# Patient Record
Sex: FEMALE | Race: WHITE | NOT HISPANIC OR LATINO | Employment: FULL TIME | URBAN - METROPOLITAN AREA
[De-identification: names, ages, dates, MRNs, and addresses within clinical notes are randomized per-mention and may not be internally consistent; named-entity substitution may affect disease eponyms.]

---

## 2021-05-31 ENCOUNTER — HOSPITAL ENCOUNTER (EMERGENCY)
Age: 66
Discharge: HOME | End: 2021-05-31
Attending: EMERGENCY MEDICINE

## 2021-05-31 VITALS
TEMPERATURE: 98 F | SYSTOLIC BLOOD PRESSURE: 141 MMHG | RESPIRATION RATE: 24 BRPM | OXYGEN SATURATION: 94 % | DIASTOLIC BLOOD PRESSURE: 71 MMHG | HEART RATE: 84 BPM

## 2021-05-31 DIAGNOSIS — L50.9 HIVES: ICD-10-CM

## 2021-05-31 DIAGNOSIS — T78.40XA ALLERGIC REACTION, INITIAL ENCOUNTER: Primary | ICD-10-CM

## 2021-05-31 PROCEDURE — 96375 TX/PRO/DX INJ NEW DRUG ADDON: CPT

## 2021-05-31 PROCEDURE — 99282 EMERGENCY DEPT VISIT SF MDM: CPT

## 2021-05-31 PROCEDURE — 10002807 HB RX 258: Performed by: EMERGENCY MEDICINE

## 2021-05-31 PROCEDURE — 10002800 HB RX 250 W HCPCS: Performed by: EMERGENCY MEDICINE

## 2021-05-31 PROCEDURE — 10002801 HB RX 250 W/O HCPCS: Performed by: EMERGENCY MEDICINE

## 2021-05-31 PROCEDURE — 96374 THER/PROPH/DIAG INJ IV PUSH: CPT

## 2021-05-31 RX ORDER — ATORVASTATIN CALCIUM 10 MG/1
TABLET, FILM COATED ORAL
COMMUNITY
Start: 2021-05-24

## 2021-05-31 RX ORDER — EPINEPHRINE 0.3 MG/.3ML
0.3 INJECTION SUBCUTANEOUS
Qty: 1 EACH | Refills: 1 | Status: SHIPPED | OUTPATIENT
Start: 2021-05-31

## 2021-05-31 RX ORDER — METFORMIN HYDROCHLORIDE 500 MG/1
TABLET, EXTENDED RELEASE ORAL
COMMUNITY
Start: 2021-05-24

## 2021-05-31 RX ORDER — FAMOTIDINE 10 MG/ML
20 INJECTION, SOLUTION INTRAVENOUS ONCE
Status: DISCONTINUED | OUTPATIENT
Start: 2021-05-31 | End: 2021-05-31 | Stop reason: HOSPADM

## 2021-05-31 RX ORDER — FAMOTIDINE 20 MG/1
20 TABLET, FILM COATED ORAL 2 TIMES DAILY
Qty: 10 TABLET | Refills: 0 | Status: SHIPPED | OUTPATIENT
Start: 2021-05-31 | End: 2021-06-05

## 2021-05-31 RX ORDER — EPINEPHRINE 0.3 MG/.3ML
INJECTION SUBCUTANEOUS
COMMUNITY
Start: 2019-02-19

## 2021-05-31 RX ORDER — DEXAMETHASONE SODIUM PHOSPHATE 10 MG/ML
10 INJECTION, SOLUTION INTRAMUSCULAR; INTRAVENOUS ONCE
Status: DISCONTINUED | OUTPATIENT
Start: 2021-05-31 | End: 2021-05-31 | Stop reason: HOSPADM

## 2021-05-31 RX ORDER — HYDROXYCHLOROQUINE SULFATE 200 MG/1
200 TABLET, FILM COATED ORAL DAILY
COMMUNITY
Start: 2021-05-03

## 2021-05-31 RX ORDER — PREDNISONE 20 MG/1
40 TABLET ORAL DAILY
Qty: 10 TABLET | Refills: 0 | Status: SHIPPED | OUTPATIENT
Start: 2021-05-31

## 2021-05-31 RX ORDER — MONTELUKAST SODIUM 10 MG/1
TABLET ORAL
COMMUNITY
Start: 2021-05-23

## 2021-05-31 RX ORDER — DIPHENHYDRAMINE HYDROCHLORIDE 50 MG/ML
25 INJECTION INTRAMUSCULAR; INTRAVENOUS ONCE
Status: DISCONTINUED | OUTPATIENT
Start: 2021-05-31 | End: 2021-05-31 | Stop reason: HOSPADM

## 2021-05-31 RX ADMIN — DEXAMETHASONE SODIUM PHOSPHATE 10 MG: 10 INJECTION, SOLUTION INTRAMUSCULAR; INTRAVENOUS at 06:50

## 2021-05-31 RX ADMIN — DIPHENHYDRAMINE HYDROCHLORIDE 25 MG: 50 INJECTION, SOLUTION INTRAMUSCULAR; INTRAVENOUS at 06:50

## 2021-05-31 RX ADMIN — SODIUM CHLORIDE 1000 ML: 9 INJECTION, SOLUTION INTRAVENOUS at 06:50

## 2021-05-31 RX ADMIN — FAMOTIDINE 20 MG: 10 INJECTION INTRAVENOUS at 06:50

## 2021-05-31 ASSESSMENT — PAIN SCALES - GENERAL
PAINLEVEL_OUTOF10: 0
PAINLEVEL_OUTOF10: 0

## 2024-05-28 ENCOUNTER — TELEPHONE (OUTPATIENT)
Dept: FAMILY MEDICINE CLINIC | Facility: CLINIC | Age: 69
End: 2024-05-28

## 2024-05-28 NOTE — TELEPHONE ENCOUNTER
ZARA on 06/18/24 with Dr. Arita @ [facility]    H&P- completed 05/31/24  Labs- BUN/Creat ratio (30.6), Calc Osmo (302), Albumin (5.1), A/G ratio (2.1), MRSA neg, all other labs WNL  EKG- WNL  X-ray- WNL

## 2024-05-29 RX ORDER — MONTELUKAST SODIUM 10 MG/1
10 TABLET ORAL DAILY
COMMUNITY

## 2024-05-29 RX ORDER — SPIRONOLACTONE 25 MG/1
25 TABLET ORAL DAILY
COMMUNITY

## 2024-05-29 RX ORDER — METFORMIN HYDROCHLORIDE EXTENDED-RELEASE TABLETS 1000 MG/1
2000 TABLET, FILM COATED, EXTENDED RELEASE ORAL
COMMUNITY

## 2024-05-29 RX ORDER — HYDROXYCHLOROQUINE SULFATE 200 MG/1
300 TABLET, FILM COATED ORAL NIGHTLY
COMMUNITY

## 2024-05-29 RX ORDER — LOSARTAN POTASSIUM AND HYDROCHLOROTHIAZIDE 12.5; 5 MG/1; MG/1
1 TABLET ORAL DAILY
COMMUNITY

## 2024-05-29 RX ORDER — MULTIVIT-MIN/IRON FUM/FOLIC AC 7.5 MG-4
1 TABLET ORAL DAILY
COMMUNITY

## 2024-05-29 RX ORDER — CHOLECALCIFEROL (VITAMIN D3) 50 MCG
TABLET ORAL
COMMUNITY

## 2024-05-29 RX ORDER — PHENOL 1.4 %
1200 AEROSOL, SPRAY (ML) MUCOUS MEMBRANE DAILY
COMMUNITY

## 2024-05-29 RX ORDER — ATORVASTATIN CALCIUM 10 MG/1
10 TABLET, FILM COATED ORAL NIGHTLY
COMMUNITY

## 2024-05-31 ENCOUNTER — LAB ENCOUNTER (OUTPATIENT)
Dept: LAB | Facility: HOSPITAL | Age: 69
End: 2024-05-31
Attending: FAMILY MEDICINE
Payer: MEDICARE

## 2024-05-31 ENCOUNTER — HOSPITAL ENCOUNTER (OUTPATIENT)
Dept: GENERAL RADIOLOGY | Facility: HOSPITAL | Age: 69
Discharge: HOME | End: 2024-05-31
Attending: FAMILY MEDICINE
Payer: MEDICARE

## 2024-05-31 ENCOUNTER — OFFICE VISIT (OUTPATIENT)
Dept: FAMILY MEDICINE CLINIC | Facility: CLINIC | Age: 69
End: 2024-05-31

## 2024-05-31 VITALS
HEART RATE: 83 BPM | HEIGHT: 63 IN | BODY MASS INDEX: 29.7 KG/M2 | OXYGEN SATURATION: 98 % | DIASTOLIC BLOOD PRESSURE: 68 MMHG | WEIGHT: 167.63 LBS | TEMPERATURE: 98 F | SYSTOLIC BLOOD PRESSURE: 118 MMHG | RESPIRATION RATE: 16 BRPM

## 2024-05-31 DIAGNOSIS — Z01.812 PRE-OPERATIVE LABORATORY EXAMINATION: ICD-10-CM

## 2024-05-31 DIAGNOSIS — M15.9 PRIMARY OSTEOARTHRITIS INVOLVING MULTIPLE JOINTS: ICD-10-CM

## 2024-05-31 DIAGNOSIS — Z01.818 PREOPERATIVE EXAMINATION, UNSPECIFIED: ICD-10-CM

## 2024-05-31 DIAGNOSIS — M17.12 PRIMARY OSTEOARTHRITIS OF LEFT KNEE: Primary | ICD-10-CM

## 2024-05-31 DIAGNOSIS — T88.59XS COMPLICATION OF ANESTHESIA, SEQUELA: ICD-10-CM

## 2024-05-31 DIAGNOSIS — R73.01 ELEVATED FASTING BLOOD SUGAR: ICD-10-CM

## 2024-05-31 DIAGNOSIS — T78.2XXS ANAPHYLAXIS, SEQUELA: ICD-10-CM

## 2024-05-31 DIAGNOSIS — I10 PRIMARY HYPERTENSION: ICD-10-CM

## 2024-05-31 DIAGNOSIS — E04.9 GOITER, NON-TOXIC: ICD-10-CM

## 2024-05-31 DIAGNOSIS — I34.1 MVP (MITRAL VALVE PROLAPSE): ICD-10-CM

## 2024-05-31 DIAGNOSIS — E78.2 MIXED HYPERLIPIDEMIA: ICD-10-CM

## 2024-05-31 DIAGNOSIS — R25.2 BILATERAL LEG CRAMPS: ICD-10-CM

## 2024-05-31 DIAGNOSIS — J45.20 MILD INTERMITTENT ASTHMA WITHOUT COMPLICATION (HCC): ICD-10-CM

## 2024-05-31 LAB
ALBUMIN SERPL-MCNC: 5.1 G/DL (ref 3.2–4.8)
ALBUMIN/GLOB SERPL: 2.1 {RATIO} (ref 1–2)
ALP LIVER SERPL-CCNC: 59 U/L
ALT SERPL-CCNC: 14 U/L
ANION GAP SERPL CALC-SCNC: 9 MMOL/L (ref 0–18)
ANTIBODY SCREEN: NEGATIVE
AST SERPL-CCNC: 23 U/L (ref ?–34)
ATRIAL RATE: 72 BPM
BASOPHILS # BLD AUTO: 0.03 X10(3) UL (ref 0–0.2)
BASOPHILS NFR BLD AUTO: 0.4 %
BILIRUB SERPL-MCNC: 0.8 MG/DL (ref 0.2–1.1)
BUN BLD-MCNC: 19 MG/DL (ref 9–23)
BUN/CREAT SERPL: 30.6 (ref 10–20)
CALCIUM BLD-MCNC: 9.8 MG/DL (ref 8.7–10.4)
CHLORIDE SERPL-SCNC: 107 MMOL/L (ref 98–112)
CO2 SERPL-SCNC: 29 MMOL/L (ref 21–32)
CREAT BLD-MCNC: 0.62 MG/DL
DEPRECATED RDW RBC AUTO: 43.7 FL (ref 35.1–46.3)
EGFRCR SERPLBLD CKD-EPI 2021: 98 ML/MIN/1.73M2 (ref 60–?)
EOSINOPHIL # BLD AUTO: 0.08 X10(3) UL (ref 0–0.7)
EOSINOPHIL NFR BLD AUTO: 1.1 %
ERYTHROCYTE [DISTWIDTH] IN BLOOD BY AUTOMATED COUNT: 13.1 % (ref 11–15)
EST. AVERAGE GLUCOSE BLD GHB EST-MCNC: 111 MG/DL (ref 68–126)
FASTING STATUS PATIENT QL REPORTED: YES
GLOBULIN PLAS-MCNC: 2.4 G/DL (ref 2–3.5)
GLUCOSE BLD-MCNC: 90 MG/DL (ref 70–99)
HBA1C MFR BLD: 5.5 % (ref ?–5.7)
HCT VFR BLD AUTO: 38.5 %
HGB BLD-MCNC: 13.5 G/DL
IMM GRANULOCYTES # BLD AUTO: 0.01 X10(3) UL (ref 0–1)
IMM GRANULOCYTES NFR BLD: 0.1 %
LYMPHOCYTES # BLD AUTO: 2.59 X10(3) UL (ref 1–4)
LYMPHOCYTES NFR BLD AUTO: 34.1 %
MCH RBC QN AUTO: 32.1 PG (ref 26–34)
MCHC RBC AUTO-ENTMCNC: 35.1 G/DL (ref 31–37)
MCV RBC AUTO: 91.4 FL
MONOCYTES # BLD AUTO: 0.52 X10(3) UL (ref 0.1–1)
MONOCYTES NFR BLD AUTO: 6.9 %
NEUTROPHILS # BLD AUTO: 4.36 X10 (3) UL (ref 1.5–7.7)
NEUTROPHILS # BLD AUTO: 4.36 X10(3) UL (ref 1.5–7.7)
NEUTROPHILS NFR BLD AUTO: 57.4 %
OSMOLALITY SERPL CALC.SUM OF ELEC: 302 MOSM/KG (ref 275–295)
P AXIS: 24 DEGREES
P-R INTERVAL: 180 MS
PLATELET # BLD AUTO: 247 10(3)UL (ref 150–450)
POTASSIUM SERPL-SCNC: 3.9 MMOL/L (ref 3.5–5.1)
PROT SERPL-MCNC: 7.5 G/DL (ref 5.7–8.2)
Q-T INTERVAL: 422 MS
QRS DURATION: 94 MS
QTC CALCULATION (BEZET): 462 MS
R AXIS: 0 DEGREES
RBC # BLD AUTO: 4.21 X10(6)UL
RH BLOOD TYPE: POSITIVE
SODIUM SERPL-SCNC: 145 MMOL/L (ref 136–145)
T AXIS: 60 DEGREES
VENTRICULAR RATE: 72 BPM
WBC # BLD AUTO: 7.6 X10(3) UL (ref 4–11)

## 2024-05-31 PROCEDURE — 80053 COMPREHEN METABOLIC PANEL: CPT

## 2024-05-31 PROCEDURE — 99204 OFFICE O/P NEW MOD 45 MIN: CPT | Performed by: FAMILY MEDICINE

## 2024-05-31 PROCEDURE — 86901 BLOOD TYPING SEROLOGIC RH(D): CPT | Performed by: FAMILY MEDICINE

## 2024-05-31 PROCEDURE — 93005 ELECTROCARDIOGRAM TRACING: CPT

## 2024-05-31 PROCEDURE — 83036 HEMOGLOBIN GLYCOSYLATED A1C: CPT

## 2024-05-31 PROCEDURE — 71046 X-RAY EXAM CHEST 2 VIEWS: CPT | Performed by: FAMILY MEDICINE

## 2024-05-31 PROCEDURE — 93010 ELECTROCARDIOGRAM REPORT: CPT | Performed by: STUDENT IN AN ORGANIZED HEALTH CARE EDUCATION/TRAINING PROGRAM

## 2024-05-31 PROCEDURE — 87081 CULTURE SCREEN ONLY: CPT

## 2024-05-31 PROCEDURE — 85025 COMPLETE CBC W/AUTO DIFF WBC: CPT

## 2024-05-31 PROCEDURE — 86850 RBC ANTIBODY SCREEN: CPT | Performed by: FAMILY MEDICINE

## 2024-05-31 PROCEDURE — 99499 UNLISTED E&M SERVICE: CPT | Performed by: FAMILY MEDICINE

## 2024-05-31 PROCEDURE — 86900 BLOOD TYPING SEROLOGIC ABO: CPT | Performed by: FAMILY MEDICINE

## 2024-05-31 PROCEDURE — 36415 COLL VENOUS BLD VENIPUNCTURE: CPT

## 2024-05-31 NOTE — H&P
[facility] PRE-OP CLINIC [place]    PRE-OP NOTE    HPI:   I have been consulted by Dr. Arita to see Grace Silva 68 year old female for a preoperative evaluation and medical clearance. She  has a long history of worsening severe degenerative arthritis of her left knee . Patient is to have a left total knee arthroplasty  by Dr. Arita  on 6/11/2024.     Pt suffers significant pain and loss of function.     She has no cardiopulmonary symptoms.      She has no history of KELSY or DVT. Denies tobacco use.       Family History   Problem Relation Age of Onset    Heart Disorder Father     Diabetes Mother     Other (Other) Maternal Grandmother         anerysm      Current Outpatient Medications   Medication Sig Dispense Refill    Multiple Vitamins-Minerals (MULTI-VITAMIN/MINERALS) Oral Tab Take 1 tablet by mouth daily.      spironolactone 25 MG Oral Tab Take 1 tablet (25 mg total) by mouth daily.      losartan-hydroCHLOROthiazide 50-12.5 MG Oral Tab Take 1 tablet by mouth daily.      montelukast 10 MG Oral Tab Take 1 tablet (10 mg total) by mouth nightly.      Calcium Carbonate 600 MG Oral Tab Take 2 tablets (1,200 mg total) by mouth daily.      metFORMIN HCl ER, OSM, 1000 MG (OSM) Oral Tablet 24 Hr Take 2 tablets (2,000 mg total) by mouth daily with breakfast.      hydroxychloroquine 200 MG Oral Tab Take 1.5 tablets (300 mg total) by mouth nightly.      atorvastatin 10 MG Oral Tab Take 1 tablet (10 mg total) by mouth nightly.      Cholecalciferol (VITAMIN D) 50 MCG (2000 UT) Oral Tab Take by mouth.       Past Medical History:    Arthritis    Asthma (HCC)    Heart disease    Heart valve disease    MVP    High blood pressure    History of adverse reaction to anesthesia    hard to wake up    Hyperlipidemia    Hypertension    MVP (mitral valve prolapse)    Nontoxic uninodular goiter    Osteoarthritis    Prediabetes    Visual impairment    glasses     Past Surgical History:   Procedure Laterality Date    Breast biopsy  Left     x2 both benign          Other surgical history      ruptured cyst on ovary, ectopic pregnancy. Fallopian tube removed    Skin biopsy       Social History     Socioeconomic History    Marital status: Not on file     Spouse name: Not on file    Number of children: Not on file    Years of education: Not on file    Highest education level: Not on file   Occupational History    Not on file   Tobacco Use    Smoking status: Never     Passive exposure: Past    Smokeless tobacco: Never   Vaping Use    Vaping status: Never Used   Substance and Sexual Activity    Alcohol use: Not Currently    Drug use: Never    Sexual activity: Not on file   Other Topics Concern    Not on file   Social History Narrative    Not on file     Social Determinants of Health     Financial Resource Strain: Not on file   Food Insecurity: Not on file   Transportation Needs: Not on file   Physical Activity: Not on file   Stress: Not on file   Social Connections: Not on file   Housing Stability: Not on file       REVIEW OF SYSTEMS:   CONSTITUTIONAL:  Denies unusual weight gain/loss, fever, chills  EENT:  Eyes:  Denies eye pain, visual loss, blurred vision, double vision. Ears, Nose, Throat:  Denies congestion, runny nose or sore throat.  INTEGUMENTARY:  Denies rashes, itching, skin lesion,   CARDIOVASCULAR:  Denies DVT. Denies chest pain, palpitations, edema, dyspnea  RESPIRATORY: She denies  KELSY ,Denies shortness of breath, wheezing, cough  GASTROINTESTINAL:  Denies abdominal pain, nausea, vomiting, constipation, diarrhea, or blood in stool.  MUSCULOSKELETAL: severe pain to her joints as noted above  NEUROLOGICAL:  Denies headache, seizures, dizziness, syncope, paralysis, ataxia,  HEMATOLOGIC:  Denies anemia, bleeding or bruising.  LYMPHATICS:  Denies enlarged nodes   PSYCHIATRIC:  Denies depression or anxiety.  ENDOCRINOLOGIC: DM 2 YES,   ALLERGIES:  Denies allergic response, history of asthma, hives,     EXAM:   /68 (BP  Location: Left arm)   Pulse 83   Temp 97.9 °F (36.6 °C) (Temporal)   Resp 16   Ht 5' 3\" (1.6 m)   Wt 167 lb 9.6 oz (76 kg)   SpO2 98%   BMI 29.69 kg/m²  Estimated body mass index is 29.69 kg/m² as calculated from the following:    Height as of this encounter: 5' 3\" (1.6 m).    Weight as of this encounter: 167 lb 9.6 oz (76 kg).   Vital signs reviewed.Appears stated age, well groomed.  Physical Exam:  GEN:  Patient is alert, awake and oriented, well developed, well nourished, no apparent distress.  HEENT:  Head:  Normocephalic, atraumatic  Nose: patent, no nasal discharge  NECK: Supple, no CLAD, no carotid bruit, no thyromegaly.  SKIN: No rashes, no skin lesion, no bruising, good turgor.  HEART:  Regular rate and rhythm, no murmurs, rubs or gallops.  LUNGS: Clear to auscultation bilterally, no rales/rhonchi/wheezing.  CHEST: No tenderness.  ABDOMEN:  Soft, nondistended, nontender,  no masses, no hepatosplenomegaly.  BACK: No tenderness, no spasm,   EXTREMITIES:  hypertrophic changes to knees ,   NEURO:  No focal deficit, speech fluent, she has an antalgic  gait, strength and tone, sensory intact      No results found for: \"WBC\", \"HGB\", \"HCT\", \"PLT\", \"CREATSERUM\", \"BUN\", \"NA\", \"K\", \"CL\", \"CO2\", \"GLU\", \"CA\", \"ALB\", \"ALKPHO\", \"BILT\", \"TP\", \"AST\", \"ALT\", \"PTT\", \"INR\", \"PT\", \"T4F\", \"TSH\", \"TSHREFLEX\", \"JASPAL\", \"LIP\", \"GGT\", \"PSA\", \"DDIMER\", \"ESRML\", \"ESRPF\", \"CRP\", \"BNP\", \"MG\", \"PHOS\", \"TROP\", \"CK\", \"CKMB\", \"ASHU\", \"RPR\", \"B12\", \"ETOH\", \"POCGLU\"    No results found.          ASSESSMENT AND PLAN:   Grace Silva is a 68 year old female, with a hx of severe degenerative arthritis of her knees  who presents for a pre-operative physical exam. Patient is to have a left total knee arthroplasty  by Dr. Arita  on 6/11/2024.      ICD-10-CM    1. Primary osteoarthritis of left knee  M17.12       2. Primary osteoarthritis involving multiple joints  M15.9       3. Mild intermittent asthma without complication (HCC)  J45.20        4. MVP (mitral valve prolapse)  I34.1       5. Primary hypertension  I10       6. Anaphylaxis to Aspirin and NSAIDS  T78.2XXS       7. Mixed hyperlipidemia  E78.2       8. history of thyroid goiter, non-toxic  E04.9       9. Bilateral leg cramps  R25.2       10. History of difficulty waking after  anesthesia and nausea sequela  T88.59XS       11. BMI 30.0-30.9,adult  Z68.30       12. Elevated fasting blood sugar  R73.01 CBC With Differential With Platelet     Comp Metabolic Panel (14)     EKG 12 Lead     Hemoglobin A1C     MSSA and MRSA Culture Screen     XR CHEST PA + LAT CHEST ([id number])     Type and screen      13. Preoperative examination, unspecified  Z01.818 CBC With Differential With Platelet     Comp Metabolic Panel (14)     EKG 12 Lead     Hemoglobin A1C     MSSA and MRSA Culture Screen     XR CHEST PA + LAT CHEST ([id number])     Type and screen      14. Pre-operative laboratory examination  Z01.812 CBC With Differential With Platelet     Comp Metabolic Panel (14)     EKG 12 Lead     Hemoglobin A1C     MSSA and MRSA Culture Screen     XR CHEST PA + LAT CHEST ([id number])     Type and screen         ECG and labs are pending review     Preoperative Risk Stratification: There are no decompensated medical conditions. ASA classification 2    Medical history:  High risk surgery (vascular, thoracic, intra-peritoneal): No  CAD: No  CHF: No  Stroke: No  DM on insulin: No  Serum Creatinine >2 mg/dl: No  Patient has an RCRI score that is  low risk and is at low risk for major cardiac event in the perioperative period.     Patient is medically optimized and has an acceptable risk of surgery and may proceed with surgery as planned.     PLAN:    Patient to discontinue medications and supplements with anticoagulation properties as per instruction.        Postoperative Recommendations:    Anticoagulation / DVT prophylaxis: SCDs, early ambulation and either Warfarin, Eliquis , or Xarelto after surgery due to ASA  allergy      GI protection: Protonix  Incentive Spirometry   Telemetry as needed  CPAP/O2 as needed   DM: QID glucoscans and sliding scale insulin as needed   Renal protection: (hydration / NSAID and ACE/ARB avoidance)   Cognitive protection: (minimize narcotics, benzodiazepines, scopolamine)     Pain management and Physical therapy as per Orthopedic service.   Home Health as needed    Thank you for the opportunity to care for your patient and to assist in managing the postoperative course.        Nick Garcias DO  5/31/2024  10:36 AM

## 2024-05-31 NOTE — H&P (VIEW-ONLY)
[facility] PRE-OP CLINIC [place]    PRE-OP NOTE    HPI:   I have been consulted by Dr. Arita to see Grace Silva 68 year old female for a preoperative evaluation and medical clearance. She  has a long history of worsening severe degenerative arthritis of her left knee . Patient is to have a left total knee arthroplasty  by Dr. Arita  on 6/11/2024.     Pt suffers significant pain and loss of function.     She has no cardiopulmonary symptoms.      She has no history of KELSY or DVT. Denies tobacco use.       Family History   Problem Relation Age of Onset    Heart Disorder Father     Diabetes Mother     Other (Other) Maternal Grandmother         anerysm      Current Outpatient Medications   Medication Sig Dispense Refill    Multiple Vitamins-Minerals (MULTI-VITAMIN/MINERALS) Oral Tab Take 1 tablet by mouth daily.      spironolactone 25 MG Oral Tab Take 1 tablet (25 mg total) by mouth daily.      losartan-hydroCHLOROthiazide 50-12.5 MG Oral Tab Take 1 tablet by mouth daily.      montelukast 10 MG Oral Tab Take 1 tablet (10 mg total) by mouth nightly.      Calcium Carbonate 600 MG Oral Tab Take 2 tablets (1,200 mg total) by mouth daily.      metFORMIN HCl ER, OSM, 1000 MG (OSM) Oral Tablet 24 Hr Take 2 tablets (2,000 mg total) by mouth daily with breakfast.      hydroxychloroquine 200 MG Oral Tab Take 1.5 tablets (300 mg total) by mouth nightly.      atorvastatin 10 MG Oral Tab Take 1 tablet (10 mg total) by mouth nightly.      Cholecalciferol (VITAMIN D) 50 MCG (2000 UT) Oral Tab Take by mouth.       Past Medical History:    Arthritis    Asthma (HCC)    Heart disease    Heart valve disease    MVP    High blood pressure    History of adverse reaction to anesthesia    hard to wake up    Hyperlipidemia    Hypertension    MVP (mitral valve prolapse)    Nontoxic uninodular goiter    Osteoarthritis    Prediabetes    Visual impairment    glasses     Past Surgical History:   Procedure Laterality Date    Breast biopsy  Left     x2 both benign          Other surgical history      ruptured cyst on ovary, ectopic pregnancy. Fallopian tube removed    Skin biopsy       Social History     Socioeconomic History    Marital status: Not on file     Spouse name: Not on file    Number of children: Not on file    Years of education: Not on file    Highest education level: Not on file   Occupational History    Not on file   Tobacco Use    Smoking status: Never     Passive exposure: Past    Smokeless tobacco: Never   Vaping Use    Vaping status: Never Used   Substance and Sexual Activity    Alcohol use: Not Currently    Drug use: Never    Sexual activity: Not on file   Other Topics Concern    Not on file   Social History Narrative    Not on file     Social Determinants of Health     Financial Resource Strain: Not on file   Food Insecurity: Not on file   Transportation Needs: Not on file   Physical Activity: Not on file   Stress: Not on file   Social Connections: Not on file   Housing Stability: Not on file       REVIEW OF SYSTEMS:   CONSTITUTIONAL:  Denies unusual weight gain/loss, fever, chills  EENT:  Eyes:  Denies eye pain, visual loss, blurred vision, double vision. Ears, Nose, Throat:  Denies congestion, runny nose or sore throat.  INTEGUMENTARY:  Denies rashes, itching, skin lesion,   CARDIOVASCULAR:  Denies DVT. Denies chest pain, palpitations, edema, dyspnea  RESPIRATORY: She denies  KELSY ,Denies shortness of breath, wheezing, cough  GASTROINTESTINAL:  Denies abdominal pain, nausea, vomiting, constipation, diarrhea, or blood in stool.  MUSCULOSKELETAL: severe pain to her joints as noted above  NEUROLOGICAL:  Denies headache, seizures, dizziness, syncope, paralysis, ataxia,  HEMATOLOGIC:  Denies anemia, bleeding or bruising.  LYMPHATICS:  Denies enlarged nodes   PSYCHIATRIC:  Denies depression or anxiety.  ENDOCRINOLOGIC: DM 2 YES,   ALLERGIES:  Denies allergic response, history of asthma, hives,     EXAM:   /68 (BP  Location: Left arm)   Pulse 83   Temp 97.9 °F (36.6 °C) (Temporal)   Resp 16   Ht 5' 3\" (1.6 m)   Wt 167 lb 9.6 oz (76 kg)   SpO2 98%   BMI 29.69 kg/m²  Estimated body mass index is 29.69 kg/m² as calculated from the following:    Height as of this encounter: 5' 3\" (1.6 m).    Weight as of this encounter: 167 lb 9.6 oz (76 kg).   Vital signs reviewed.Appears stated age, well groomed.  Physical Exam:  GEN:  Patient is alert, awake and oriented, well developed, well nourished, no apparent distress.  HEENT:  Head:  Normocephalic, atraumatic  Nose: patent, no nasal discharge  NECK: Supple, no CLAD, no carotid bruit, no thyromegaly.  SKIN: No rashes, no skin lesion, no bruising, good turgor.  HEART:  Regular rate and rhythm, no murmurs, rubs or gallops.  LUNGS: Clear to auscultation bilterally, no rales/rhonchi/wheezing.  CHEST: No tenderness.  ABDOMEN:  Soft, nondistended, nontender,  no masses, no hepatosplenomegaly.  BACK: No tenderness, no spasm,   EXTREMITIES:  hypertrophic changes to knees ,   NEURO:  No focal deficit, speech fluent, she has an antalgic  gait, strength and tone, sensory intact      No results found for: \"WBC\", \"HGB\", \"HCT\", \"PLT\", \"CREATSERUM\", \"BUN\", \"NA\", \"K\", \"CL\", \"CO2\", \"GLU\", \"CA\", \"ALB\", \"ALKPHO\", \"BILT\", \"TP\", \"AST\", \"ALT\", \"PTT\", \"INR\", \"PT\", \"T4F\", \"TSH\", \"TSHREFLEX\", \"JASPAL\", \"LIP\", \"GGT\", \"PSA\", \"DDIMER\", \"ESRML\", \"ESRPF\", \"CRP\", \"BNP\", \"MG\", \"PHOS\", \"TROP\", \"CK\", \"CKMB\", \"ASHU\", \"RPR\", \"B12\", \"ETOH\", \"POCGLU\"    No results found.          ASSESSMENT AND PLAN:   Grace Silva is a 68 year old female, with a hx of severe degenerative arthritis of her knees  who presents for a pre-operative physical exam. Patient is to have a left total knee arthroplasty  by Dr. Arita  on 6/11/2024.      ICD-10-CM    1. Primary osteoarthritis of left knee  M17.12       2. Primary osteoarthritis involving multiple joints  M15.9       3. Mild intermittent asthma without complication (HCC)  J45.20        4. MVP (mitral valve prolapse)  I34.1       5. Primary hypertension  I10       6. Anaphylaxis to Aspirin and NSAIDS  T78.2XXS       7. Mixed hyperlipidemia  E78.2       8. history of thyroid goiter, non-toxic  E04.9       9. Bilateral leg cramps  R25.2       10. History of difficulty waking after  anesthesia and nausea sequela  T88.59XS       11. BMI 30.0-30.9,adult  Z68.30       12. Elevated fasting blood sugar  R73.01 CBC With Differential With Platelet     Comp Metabolic Panel (14)     EKG 12 Lead     Hemoglobin A1C     MSSA and MRSA Culture Screen     XR CHEST PA + LAT CHEST ([id number])     Type and screen      13. Preoperative examination, unspecified  Z01.818 CBC With Differential With Platelet     Comp Metabolic Panel (14)     EKG 12 Lead     Hemoglobin A1C     MSSA and MRSA Culture Screen     XR CHEST PA + LAT CHEST ([id number])     Type and screen      14. Pre-operative laboratory examination  Z01.812 CBC With Differential With Platelet     Comp Metabolic Panel (14)     EKG 12 Lead     Hemoglobin A1C     MSSA and MRSA Culture Screen     XR CHEST PA + LAT CHEST ([id number])     Type and screen         ECG and labs are pending review     Preoperative Risk Stratification: There are no decompensated medical conditions. ASA classification 2    Medical history:  High risk surgery (vascular, thoracic, intra-peritoneal): No  CAD: No  CHF: No  Stroke: No  DM on insulin: No  Serum Creatinine >2 mg/dl: No  Patient has an RCRI score that is  low risk and is at low risk for major cardiac event in the perioperative period.     Patient is medically optimized and has an acceptable risk of surgery and may proceed with surgery as planned.     PLAN:    Patient to discontinue medications and supplements with anticoagulation properties as per instruction.        Postoperative Recommendations:    Anticoagulation / DVT prophylaxis: SCDs, early ambulation and either Warfarin, Eliquis , or Xarelto after surgery due to ASA  allergy      GI protection: Protonix  Incentive Spirometry   Telemetry as needed  CPAP/O2 as needed   DM: QID glucoscans and sliding scale insulin as needed   Renal protection: (hydration / NSAID and ACE/ARB avoidance)   Cognitive protection: (minimize narcotics, benzodiazepines, scopolamine)     Pain management and Physical therapy as per Orthopedic service.   Home Health as needed    Thank you for the opportunity to care for your patient and to assist in managing the postoperative course.        Nick Garcias DO  5/31/2024  10:36 AM

## 2024-06-01 NOTE — TELEPHONE ENCOUNTER
Dr. Garcias, please review:    Labs- BUN/Creat ratio (30.6), Calc Osmo (302), Albumin (5.1), A/G ratio (2.1), MRSA neg, all other labs WNL    EKG- WNL    X-ray- WNL    OK for surgery?

## 2024-06-03 NOTE — TELEPHONE ENCOUNTER
Spoke to patient, went over all test results and let her know she is clear for surgery per Dr. Garcias.

## 2024-06-07 PROBLEM — M17.12 PRIMARY OSTEOARTHRITIS OF ONE KNEE, LEFT: Status: ACTIVE | Noted: 2024-06-07

## 2024-06-07 PROBLEM — R25.2 LEG CRAMPS: Status: ACTIVE | Noted: 2024-06-07

## 2024-06-07 PROBLEM — T78.2XXS: Status: ACTIVE | Noted: 2024-06-07

## 2024-06-14 NOTE — CM/SW NOTE
DSC received email from Annika CUMMINGS, Pt has been referred to [facility]  prior to surgery.    DSC to send referral via [facility] upon admission.     Юлия Miguel, DSC

## 2024-06-17 ENCOUNTER — ANESTHESIA EVENT (OUTPATIENT)
Dept: SURGERY | Facility: HOSPITAL | Age: 69
End: 2024-06-17

## 2024-06-18 ENCOUNTER — ANESTHESIA (OUTPATIENT)
Dept: SURGERY | Facility: HOSPITAL | Age: 69
End: 2024-06-18

## 2024-06-18 ENCOUNTER — APPOINTMENT (OUTPATIENT)
Dept: GENERAL RADIOLOGY | Facility: HOSPITAL | Age: 69
End: 2024-06-18
Attending: ORTHOPAEDIC SURGERY

## 2024-06-18 ENCOUNTER — HOSPITAL ENCOUNTER (OUTPATIENT)
Facility: HOSPITAL | Age: 69
LOS: 1 days | Discharge: HOME HEALTH | End: 2024-06-19
Attending: ORTHOPAEDIC SURGERY | Admitting: ORTHOPAEDIC SURGERY

## 2024-06-18 DIAGNOSIS — M17.12 PRIMARY OSTEOARTHRITIS OF ONE KNEE, LEFT: Primary | ICD-10-CM

## 2024-06-18 PROBLEM — R73.03 PREDIABETES: Status: ACTIVE | Noted: 2024-06-18

## 2024-06-18 LAB
GLUCOSE BLDC GLUCOMTR-MCNC: 124 MG/DL (ref 70–99)
GLUCOSE BLDC GLUCOMTR-MCNC: 133 MG/DL (ref 70–99)
GLUCOSE BLDC GLUCOMTR-MCNC: 192 MG/DL (ref 70–99)
RH BLOOD TYPE: POSITIVE

## 2024-06-18 PROCEDURE — 97161 PT EVAL LOW COMPLEX 20 MIN: CPT

## 2024-06-18 PROCEDURE — 82962 GLUCOSE BLOOD TEST: CPT

## 2024-06-18 PROCEDURE — 97530 THERAPEUTIC ACTIVITIES: CPT

## 2024-06-18 PROCEDURE — 88305 TISSUE EXAM BY PATHOLOGIST: CPT | Performed by: ORTHOPAEDIC SURGERY

## 2024-06-18 PROCEDURE — 88311 DECALCIFY TISSUE: CPT | Performed by: ORTHOPAEDIC SURGERY

## 2024-06-18 PROCEDURE — 0SRD0J9 REPLACEMENT OF LEFT KNEE JOINT WITH SYNTHETIC SUBSTITUTE, CEMENTED, OPEN APPROACH: ICD-10-PCS | Performed by: ORTHOPAEDIC SURGERY

## 2024-06-18 PROCEDURE — 73560 X-RAY EXAM OF KNEE 1 OR 2: CPT | Performed by: ORTHOPAEDIC SURGERY

## 2024-06-18 DEVICE — IMPLANTABLE DEVICE
Type: IMPLANTABLE DEVICE | Site: KNEE | Status: FUNCTIONAL
Brand: PERSONA®

## 2024-06-18 DEVICE — IMPLANTABLE DEVICE
Type: IMPLANTABLE DEVICE | Site: KNEE | Status: FUNCTIONAL
Brand: PERSONA® VIVACIT-E®

## 2024-06-18 DEVICE — IMPLANTABLE DEVICE
Type: IMPLANTABLE DEVICE | Site: KNEE | Status: FUNCTIONAL
Brand: PERSONA® NATURAL TIBIA®

## 2024-06-18 DEVICE — FULL DOSE BONE CEMENT, 10 PACK CATALOG NUMBER IS 6191-1-010
Type: IMPLANTABLE DEVICE | Site: KNEE | Status: FUNCTIONAL
Brand: SIMPLEX

## 2024-06-18 RX ORDER — DEXTROSE MONOHYDRATE 25 G/50ML
50 INJECTION, SOLUTION INTRAVENOUS
Status: DISCONTINUED | OUTPATIENT
Start: 2024-06-18 | End: 2024-06-19

## 2024-06-18 RX ORDER — HYDROXYCHLOROQUINE SULFATE 200 MG/1
300 TABLET, FILM COATED ORAL NIGHTLY
Status: DISCONTINUED | OUTPATIENT
Start: 2024-06-18 | End: 2024-06-19

## 2024-06-18 RX ORDER — DOCUSATE SODIUM 100 MG/1
100 CAPSULE, LIQUID FILLED ORAL 2 TIMES DAILY
Status: DISCONTINUED | OUTPATIENT
Start: 2024-06-18 | End: 2024-06-19

## 2024-06-18 RX ORDER — NALOXONE HYDROCHLORIDE 4 MG/.1ML
4 SPRAY NASAL AS NEEDED
Qty: 1 KIT | Refills: 0 | Status: SHIPPED | OUTPATIENT
Start: 2024-06-18

## 2024-06-18 RX ORDER — LIDOCAINE HYDROCHLORIDE 10 MG/ML
INJECTION, SOLUTION EPIDURAL; INFILTRATION; INTRACAUDAL; PERINEURAL AS NEEDED
Status: DISCONTINUED | OUTPATIENT
Start: 2024-06-18 | End: 2024-06-18 | Stop reason: SURG

## 2024-06-18 RX ORDER — NICOTINE POLACRILEX 4 MG
30 LOZENGE BUCCAL
Status: DISCONTINUED | OUTPATIENT
Start: 2024-06-18 | End: 2024-06-19

## 2024-06-18 RX ORDER — NICOTINE POLACRILEX 4 MG
15 LOZENGE BUCCAL
Status: DISCONTINUED | OUTPATIENT
Start: 2024-06-18 | End: 2024-06-19

## 2024-06-18 RX ORDER — SODIUM CHLORIDE, SODIUM LACTATE, POTASSIUM CHLORIDE, CALCIUM CHLORIDE 600; 310; 30; 20 MG/100ML; MG/100ML; MG/100ML; MG/100ML
INJECTION, SOLUTION INTRAVENOUS CONTINUOUS
Status: DISCONTINUED | OUTPATIENT
Start: 2024-06-18 | End: 2024-06-19

## 2024-06-18 RX ORDER — ONDANSETRON 2 MG/ML
4 INJECTION INTRAMUSCULAR; INTRAVENOUS EVERY 6 HOURS PRN
Status: DISCONTINUED | OUTPATIENT
Start: 2024-06-18 | End: 2024-06-18 | Stop reason: HOSPADM

## 2024-06-18 RX ORDER — DIPHENHYDRAMINE HYDROCHLORIDE 50 MG/ML
12.5 INJECTION INTRAMUSCULAR; INTRAVENOUS EVERY 4 HOURS PRN
Status: DISCONTINUED | OUTPATIENT
Start: 2024-06-18 | End: 2024-06-19

## 2024-06-18 RX ORDER — SODIUM CHLORIDE, SODIUM LACTATE, POTASSIUM CHLORIDE, CALCIUM CHLORIDE 600; 310; 30; 20 MG/100ML; MG/100ML; MG/100ML; MG/100ML
INJECTION, SOLUTION INTRAVENOUS CONTINUOUS
Status: DISCONTINUED | OUTPATIENT
Start: 2024-06-18 | End: 2024-06-18

## 2024-06-18 RX ORDER — SODIUM CHLORIDE 9 MG/ML
INJECTION, SOLUTION INTRAVENOUS CONTINUOUS
Status: DISCONTINUED | OUTPATIENT
Start: 2024-06-18 | End: 2024-06-19

## 2024-06-18 RX ORDER — METOCLOPRAMIDE HYDROCHLORIDE 5 MG/ML
10 INJECTION INTRAMUSCULAR; INTRAVENOUS EVERY 8 HOURS PRN
Status: DISCONTINUED | OUTPATIENT
Start: 2024-06-18 | End: 2024-06-19

## 2024-06-18 RX ORDER — NALOXONE HYDROCHLORIDE 0.4 MG/ML
80 INJECTION, SOLUTION INTRAMUSCULAR; INTRAVENOUS; SUBCUTANEOUS AS NEEDED
Status: DISCONTINUED | OUTPATIENT
Start: 2024-06-18 | End: 2024-06-18 | Stop reason: HOSPADM

## 2024-06-18 RX ORDER — METOCLOPRAMIDE HYDROCHLORIDE 5 MG/ML
10 INJECTION INTRAMUSCULAR; INTRAVENOUS EVERY 8 HOURS PRN
Status: DISCONTINUED | OUTPATIENT
Start: 2024-06-18 | End: 2024-06-18 | Stop reason: HOSPADM

## 2024-06-18 RX ORDER — MIDAZOLAM HYDROCHLORIDE 1 MG/ML
INJECTION INTRAMUSCULAR; INTRAVENOUS AS NEEDED
Status: DISCONTINUED | OUTPATIENT
Start: 2024-06-18 | End: 2024-06-18 | Stop reason: SURG

## 2024-06-18 RX ORDER — HYDROMORPHONE HYDROCHLORIDE 1 MG/ML
0.4 INJECTION, SOLUTION INTRAMUSCULAR; INTRAVENOUS; SUBCUTANEOUS EVERY 5 MIN PRN
Status: DISCONTINUED | OUTPATIENT
Start: 2024-06-18 | End: 2024-06-18 | Stop reason: HOSPADM

## 2024-06-18 RX ORDER — TRANEXAMIC ACID 10 MG/ML
1000 INJECTION, SOLUTION INTRAVENOUS ONCE
Status: DISCONTINUED | OUTPATIENT
Start: 2024-06-19 | End: 2024-06-18 | Stop reason: HOSPADM

## 2024-06-18 RX ORDER — HYDROMORPHONE HYDROCHLORIDE 1 MG/ML
0.2 INJECTION, SOLUTION INTRAMUSCULAR; INTRAVENOUS; SUBCUTANEOUS EVERY 2 HOUR PRN
Status: DISCONTINUED | OUTPATIENT
Start: 2024-06-18 | End: 2024-06-19

## 2024-06-18 RX ORDER — MAGNESIUM HYDROXIDE 1200 MG/15ML
LIQUID ORAL CONTINUOUS PRN
Status: DISCONTINUED | OUTPATIENT
Start: 2024-06-18 | End: 2024-06-19

## 2024-06-18 RX ORDER — ONDANSETRON 2 MG/ML
4 INJECTION INTRAMUSCULAR; INTRAVENOUS EVERY 6 HOURS PRN
Status: DISCONTINUED | OUTPATIENT
Start: 2024-06-18 | End: 2024-06-19

## 2024-06-18 RX ORDER — MONTELUKAST SODIUM 10 MG/1
10 TABLET ORAL NIGHTLY
Status: DISCONTINUED | OUTPATIENT
Start: 2024-06-18 | End: 2024-06-19

## 2024-06-18 RX ORDER — POLYETHYLENE GLYCOL 3350 17 G/17G
17 POWDER, FOR SOLUTION ORAL DAILY PRN
Status: DISCONTINUED | OUTPATIENT
Start: 2024-06-18 | End: 2024-06-19

## 2024-06-18 RX ORDER — SENNOSIDES 8.6 MG
17.2 TABLET ORAL NIGHTLY
Status: DISCONTINUED | OUTPATIENT
Start: 2024-06-18 | End: 2024-06-19

## 2024-06-18 RX ORDER — ENEMA 19; 7 G/133ML; G/133ML
1 ENEMA RECTAL ONCE AS NEEDED
Status: DISCONTINUED | OUTPATIENT
Start: 2024-06-18 | End: 2024-06-19

## 2024-06-18 RX ORDER — TRANEXAMIC ACID 10 MG/ML
1000 INJECTION, SOLUTION INTRAVENOUS ONCE
Status: COMPLETED | OUTPATIENT
Start: 2024-06-18 | End: 2024-06-19

## 2024-06-18 RX ORDER — MORPHINE SULFATE 10 MG/ML
6 INJECTION, SOLUTION INTRAMUSCULAR; INTRAVENOUS EVERY 10 MIN PRN
Status: DISCONTINUED | OUTPATIENT
Start: 2024-06-18 | End: 2024-06-18 | Stop reason: HOSPADM

## 2024-06-18 RX ORDER — TRAMADOL HYDROCHLORIDE 50 MG/1
50 TABLET ORAL EVERY 6 HOURS SCHEDULED
Status: DISCONTINUED | OUTPATIENT
Start: 2024-06-18 | End: 2024-06-19

## 2024-06-18 RX ORDER — ATORVASTATIN CALCIUM 10 MG/1
10 TABLET, FILM COATED ORAL NIGHTLY
Status: DISCONTINUED | OUTPATIENT
Start: 2024-06-18 | End: 2024-06-19

## 2024-06-18 RX ORDER — HYDROMORPHONE HYDROCHLORIDE 1 MG/ML
0.4 INJECTION, SOLUTION INTRAMUSCULAR; INTRAVENOUS; SUBCUTANEOUS EVERY 2 HOUR PRN
Status: DISCONTINUED | OUTPATIENT
Start: 2024-06-18 | End: 2024-06-19

## 2024-06-18 RX ORDER — OXYCODONE HYDROCHLORIDE 5 MG/1
5 TABLET ORAL EVERY 4 HOURS PRN
Status: SHIPPED | COMMUNITY
Start: 2024-06-18 | End: 2024-06-19

## 2024-06-18 RX ORDER — ACETAMINOPHEN 500 MG
1000 TABLET ORAL EVERY 8 HOURS SCHEDULED
Status: DISCONTINUED | OUTPATIENT
Start: 2024-06-18 | End: 2024-06-19

## 2024-06-18 RX ORDER — OXYCODONE HYDROCHLORIDE 5 MG/1
2.5 TABLET ORAL EVERY 4 HOURS PRN
Status: DISCONTINUED | OUTPATIENT
Start: 2024-06-18 | End: 2024-06-19

## 2024-06-18 RX ORDER — DIPHENHYDRAMINE HCL 25 MG
25 CAPSULE ORAL EVERY 4 HOURS PRN
Status: DISCONTINUED | OUTPATIENT
Start: 2024-06-18 | End: 2024-06-19

## 2024-06-18 RX ORDER — ESMOLOL HYDROCHLORIDE 10 MG/ML
INJECTION INTRAVENOUS AS NEEDED
Status: DISCONTINUED | OUTPATIENT
Start: 2024-06-18 | End: 2024-06-18 | Stop reason: SURG

## 2024-06-18 RX ORDER — HYDROMORPHONE HYDROCHLORIDE 1 MG/ML
0.6 INJECTION, SOLUTION INTRAMUSCULAR; INTRAVENOUS; SUBCUTANEOUS EVERY 5 MIN PRN
Status: DISCONTINUED | OUTPATIENT
Start: 2024-06-18 | End: 2024-06-18 | Stop reason: HOSPADM

## 2024-06-18 RX ORDER — BUPIVACAINE HYDROCHLORIDE 7.5 MG/ML
INJECTION, SOLUTION INTRASPINAL
Status: COMPLETED | OUTPATIENT
Start: 2024-06-18 | End: 2024-06-18

## 2024-06-18 RX ORDER — BISACODYL 10 MG
10 SUPPOSITORY, RECTAL RECTAL
Status: DISCONTINUED | OUTPATIENT
Start: 2024-06-18 | End: 2024-06-19

## 2024-06-18 RX ORDER — TRANEXAMIC ACID 100 MG/ML
INJECTION, SOLUTION INTRAVENOUS AS NEEDED
Status: DISCONTINUED | OUTPATIENT
Start: 2024-06-18 | End: 2024-06-18 | Stop reason: SURG

## 2024-06-18 RX ORDER — MORPHINE SULFATE 4 MG/ML
4 INJECTION, SOLUTION INTRAMUSCULAR; INTRAVENOUS EVERY 10 MIN PRN
Status: DISCONTINUED | OUTPATIENT
Start: 2024-06-18 | End: 2024-06-18 | Stop reason: HOSPADM

## 2024-06-18 RX ORDER — METOCLOPRAMIDE HYDROCHLORIDE 5 MG/ML
INJECTION INTRAMUSCULAR; INTRAVENOUS AS NEEDED
Status: DISCONTINUED | OUTPATIENT
Start: 2024-06-18 | End: 2024-06-18 | Stop reason: SURG

## 2024-06-18 RX ORDER — DIPHENHYDRAMINE HYDROCHLORIDE 50 MG/ML
INJECTION INTRAMUSCULAR; INTRAVENOUS AS NEEDED
Status: DISCONTINUED | OUTPATIENT
Start: 2024-06-18 | End: 2024-06-18 | Stop reason: SURG

## 2024-06-18 RX ORDER — MORPHINE SULFATE 4 MG/ML
2 INJECTION, SOLUTION INTRAMUSCULAR; INTRAVENOUS EVERY 10 MIN PRN
Status: DISCONTINUED | OUTPATIENT
Start: 2024-06-18 | End: 2024-06-18 | Stop reason: HOSPADM

## 2024-06-18 RX ORDER — OXYCODONE HCL 10 MG/1
10 TABLET, FILM COATED, EXTENDED RELEASE ORAL ONCE
Status: COMPLETED | OUTPATIENT
Start: 2024-06-18 | End: 2024-06-18

## 2024-06-18 RX ORDER — ONDANSETRON 2 MG/ML
INJECTION INTRAMUSCULAR; INTRAVENOUS AS NEEDED
Status: DISCONTINUED | OUTPATIENT
Start: 2024-06-18 | End: 2024-06-18 | Stop reason: SURG

## 2024-06-18 RX ORDER — OXYCODONE HYDROCHLORIDE 5 MG/1
5 TABLET ORAL EVERY 4 HOURS PRN
Status: DISCONTINUED | OUTPATIENT
Start: 2024-06-18 | End: 2024-06-19

## 2024-06-18 RX ORDER — HYDROMORPHONE HYDROCHLORIDE 1 MG/ML
0.2 INJECTION, SOLUTION INTRAMUSCULAR; INTRAVENOUS; SUBCUTANEOUS EVERY 5 MIN PRN
Status: DISCONTINUED | OUTPATIENT
Start: 2024-06-18 | End: 2024-06-18 | Stop reason: HOSPADM

## 2024-06-18 RX ORDER — DEXAMETHASONE SODIUM PHOSPHATE 4 MG/ML
VIAL (ML) INJECTION AS NEEDED
Status: DISCONTINUED | OUTPATIENT
Start: 2024-06-18 | End: 2024-06-18 | Stop reason: SURG

## 2024-06-18 RX ORDER — ACETAMINOPHEN 500 MG
1000 TABLET ORAL ONCE
Status: COMPLETED | OUTPATIENT
Start: 2024-06-18 | End: 2024-06-18

## 2024-06-18 RX ADMIN — LIDOCAINE HYDROCHLORIDE 50 MG: 10 INJECTION, SOLUTION EPIDURAL; INFILTRATION; INTRACAUDAL; PERINEURAL at 09:15:00

## 2024-06-18 RX ADMIN — DIPHENHYDRAMINE HYDROCHLORIDE 12.5 MG: 50 INJECTION INTRAMUSCULAR; INTRAVENOUS at 09:03:00

## 2024-06-18 RX ADMIN — ONDANSETRON 4 MG: 2 INJECTION INTRAMUSCULAR; INTRAVENOUS at 11:03:00

## 2024-06-18 RX ADMIN — TRANEXAMIC ACID 1000 MG: 100 INJECTION, SOLUTION INTRAVENOUS at 09:14:00

## 2024-06-18 RX ADMIN — SODIUM CHLORIDE, SODIUM LACTATE, POTASSIUM CHLORIDE, CALCIUM CHLORIDE: 600; 310; 30; 20 INJECTION, SOLUTION INTRAVENOUS at 08:59:00

## 2024-06-18 RX ADMIN — MIDAZOLAM HYDROCHLORIDE 1 MG: 1 INJECTION INTRAMUSCULAR; INTRAVENOUS at 09:03:00

## 2024-06-18 RX ADMIN — ESMOLOL HYDROCHLORIDE 10 MG: 10 INJECTION INTRAVENOUS at 09:44:00

## 2024-06-18 RX ADMIN — DEXAMETHASONE SODIUM PHOSPHATE 8 MG: 4 MG/ML VIAL (ML) INJECTION at 09:36:00

## 2024-06-18 RX ADMIN — MIDAZOLAM HYDROCHLORIDE 1 MG: 1 INJECTION INTRAMUSCULAR; INTRAVENOUS at 08:59:00

## 2024-06-18 RX ADMIN — BUPIVACAINE HYDROCHLORIDE 1.5 ML: 7.5 INJECTION, SOLUTION INTRASPINAL at 09:11:00

## 2024-06-18 RX ADMIN — SODIUM CHLORIDE, SODIUM LACTATE, POTASSIUM CHLORIDE, CALCIUM CHLORIDE: 600; 310; 30; 20 INJECTION, SOLUTION INTRAVENOUS at 10:55:00

## 2024-06-18 RX ADMIN — METOCLOPRAMIDE HYDROCHLORIDE 10 MG: 5 INJECTION INTRAMUSCULAR; INTRAVENOUS at 09:59:00

## 2024-06-18 NOTE — ANESTHESIA PREPROCEDURE EVALUATION
Anesthesia PreOp Note    HPI:     Grace Silva is a 68 year old female who presents for preoperative consultation requested by: Rishabh Arita MD    Date of Surgery: 2024    Procedure(s):  Left total knee arthroplasty  Indication: Degenerative joint disease left knee    Relevant Problems   No relevant active problems       NPO:  Last Liquid Consumption Date: 24  Last Liquid Consumption Time:   Last Solid Consumption Date: 24  Last Solid Consumption Time:   Last Liquid Consumption Date: 24          History Review:  Patient Active Problem List    Diagnosis Date Noted    Arthritis of left knee 2024    Generalized anaphylaxis, sequela 2024    Leg cramps 2024    Primary osteoarthritis of one knee, left 2024    Asthma (HCC) 2015    Essential hypertension 2015    Hyperlipidemia 2015       Past Medical History:    Arthritis    Asthma (HCC)    Heart disease    Heart valve disease    MVP    High blood pressure    History of adverse reaction to anesthesia    hard to wake up    Hyperlipidemia    Hypertension    MVP (mitral valve prolapse)    Nontoxic uninodular goiter    Osteoarthritis    Prediabetes    Visual impairment    glasses       Past Surgical History:   Procedure Laterality Date    Breast biopsy Left     x2 both benign          Other surgical history      ruptured cyst on ovary, ectopic pregnancy. Fallopian tube removed    Skin biopsy         Medications Prior to Admission   Medication Sig Dispense Refill Last Dose    Multiple Vitamins-Minerals (MULTI-VITAMIN/MINERALS) Oral Tab Take 1 tablet by mouth daily.   2024    spironolactone 25 MG Oral Tab Take 1 tablet (25 mg total) by mouth daily.   2024 at 800    losartan-hydroCHLOROthiazide 50-12.5 MG Oral Tab Take 1 tablet by mouth daily.   2024 at 800    montelukast 10 MG Oral Tab Take 1 tablet (10 mg total) by mouth nightly.   2024 at 800    Calcium Carbonate 600  MG Oral Tab Take 2 tablets (1,200 mg total) by mouth daily.   6/11/2024    metFORMIN HCl ER, OSM, 1000 MG (OSM) Oral Tablet 24 Hr Take 2 tablets (2,000 mg total) by mouth daily with breakfast.   6/17/2024 at 1730    hydroxychloroquine 200 MG Oral Tab Take 1.5 tablets (300 mg total) by mouth nightly.   6/17/2024 at 1730    atorvastatin 10 MG Oral Tab Take 1 tablet (10 mg total) by mouth nightly.   6/17/2024 at 1730    Cholecalciferol (VITAMIN D) 50 MCG (2000 UT) Oral Tab Take by mouth.   6/11/2024     Current Facility-Administered Medications Ordered in Epic   Medication Dose Route Frequency Provider Last Rate Last Admin    lactated ringers infusion   Intravenous Continuous Rishabh Arita MD 20 mL/hr at 06/18/24 0728 New Bag at 06/18/24 0728    ceFAZolin (Ancef) 2g in 10mL IV syringe premix  2 g Intravenous Once Rishabh Arita MD        clonidine-EPINEPHrine-ropivacaine (CERs) (Duraclon-Adrenalin-Naropin) pain cocktain irrigation (for NSAId allergic patients)   Intra-articular Once (Intra-Op) Rishabh Arita MD         No current [facility]-ordered outpatient medications on file.       Allergies   Allergen Reactions    Aspirin ANAPHYLAXIS    Nsaids ANAPHYLAXIS       Family History   Problem Relation Age of Onset    Heart Disorder Father     Diabetes Mother     Other (Other) Maternal Grandmother         anerysm     Social History     Socioeconomic History    Marital status:    Tobacco Use    Smoking status: Never     Passive exposure: Past    Smokeless tobacco: Never   Vaping Use    Vaping status: Never Used   Substance and Sexual Activity    Alcohol use: Not Currently    Drug use: Never       Available pre-op labs reviewed.  Lab Results   Component Value Date    WBC 7.6 05/31/2024    RBC 4.21 05/31/2024    HGB 13.5 05/31/2024    HCT 38.5 05/31/2024    MCV 91.4 05/31/2024    MCH 32.1 05/31/2024    MCHC 35.1 05/31/2024    RDW 13.1 05/31/2024    .0 05/31/2024     Lab Results   Component Value Date      05/31/2024    K 3.9 05/31/2024     05/31/2024    CO2 29.0 05/31/2024    BUN 19 05/31/2024    CREATSERUM 0.62 05/31/2024    GLU 90 05/31/2024    PGLU 124 (H) 06/18/2024    CA 9.8 05/31/2024          Vital Signs:  Body mass index is 29.94 kg/m².   height is 1.6 m (5' 3\") and weight is 76.7 kg (169 lb). Her oral temperature is 97.8 °F (36.6 °C). Her blood pressure is 137/68 and her pulse is 83. Her respiration is 16 and oxygen saturation is 98%.   Vitals:    05/29/24 1005 06/18/24 0651   BP:  137/68   Pulse:  83   Resp:  16   Temp:  97.8 °F (36.6 °C)   TempSrc:  Oral   SpO2:  98%   Weight: 75.8 kg (167 lb) 76.7 kg (169 lb)   Height: 1.6 m (5' 3\")         Anesthesia Evaluation     Patient summary reviewed and Nursing notes reviewed    Airway   Mallampati: II  TM distance: >3 FB  Neck ROM: full  Dental      Pulmonary     breath sounds clear to auscultation  (+) asthma  (-) COPD, sleep apnea  Cardiovascular   (+) hypertension  (-) CAD, CHF    Rhythm: regular  Rate: normal    Neuro/Psych - negative ROS   (-) CVA    GI/Hepatic/Renal - negative ROS   (-) GERD, liver disease, renal disease    Endo/Other    (+) arthritis  (-) diabetes mellitus, hypothyroidism  Abdominal                  Anesthesia Plan:   ASA:  2  Plan:   Spinal  Post-op Pain Management: Oral pain medication and IV analgesics  Informed Consent Plan and Risks Discussed With:  Patient  Consent Comment: Risks of spinal including infection, hematoma, nerve damage and risks of GA including N/V, sore throat, respiratory/cardiac compromise explained to pt and pt voiced understanding; all questions answered.      Discussed plan with:  CRNA      I have informed Grace Silva and/or legal guardian or family member of the nature of the anesthetic plan, benefits, risks including possible dental damage if relevant, major complications, and any alternative forms of anesthetic management.   All of the patient's questions were answered to the best of my  ability. The patient desires the anesthetic management as planned.  Andres Winters MD  6/18/2024 08:05 AM  Present on Admission:   Asthma (HCC)   Essential hypertension   Hyperlipidemia   Primary osteoarthritis of one knee, left

## 2024-06-18 NOTE — PHYSICAL THERAPY NOTE
PHYSICAL THERAPY KNEE EVALUATION - INPATIENT      Room Number: Room 5/Room 5-A  Evaluation Date: 6/18/2024  Type of Evaluation: Initial  Physician Order: PT Eval and Treat    Presenting Problem: s/p L TKA on 6/18     Reason for Therapy: Mobility Dysfunction and Discharge Planning    PHYSICAL THERAPY ASSESSMENT   Patient is a 68 year old female admitted 6/18/2024 for L TKA.  Prior to admission, patient's baseline is independent with ADLs and functional mobility without assistive device.  Patient is currently functioning below baseline with bed mobility, transfers, gait, stair negotiation, standing prolonged periods, and performing household tasks.  Patient is requiring contact guard assist as a result of the following impairments: decreased functional strength, pain, impaired dynamic standing balance, decreased muscular endurance, medical status, and limited L knee ROM.  Physical Therapy will continue to follow for duration of hospitalization.    Patient will benefit from continued skilled PT Services at discharge to promote functional independence and safety with additional support and return home with home health PT.    PLAN  PT Treatment Plan: Bed mobility;Body mechanics;Endurance;Energy conservation;Patient education;Gait training;Strengthening;Transfer training;Stair training;Balance training  Rehab Potential : Good  Frequency (Obs): BID    PHYSICAL THERAPY MEDICAL/SOCIAL HISTORY     Problem List  Principal Problem:    Primary osteoarthritis of one knee, left  Active Problems:    Asthma (HCC)    Essential hypertension    Hyperlipidemia    Generalized anaphylaxis, sequela    Leg cramps    Arthritis of left knee    Prediabetes      HOME SITUATION  Home Situation  Type of Home: House  Home Layout: Two level;Bed/bath upstairs  Stairs to Enter : 3  Railing: Yes  Stairs to Bedroom: 18  Railing: Yes  Lives With: Spouse  Drives: Yes  Patient Owned Equipment: Cane;Other (Comment) (standard walker)  Patient Regularly  Uses: Glasses     Prior Level of [place]: independent     SUBJECTIVE  Agreeable to activity.     PHYSICAL THERAPY EXAMINATION   OBJECTIVE  Precautions: None  Fall Risk: Standard fall risk    WEIGHT BEARING RESTRICTION  Weight Bearing Restriction: L lower extremity  L Lower Extremity: Weight Bearing as Tolerated    PAIN ASSESSMENT  Rating: 3  Location: left knee/ankle  Management Techniques: Activity promotion;Body mechanics;Repositioning    COGNITION  Overall Cognitive Status:  WFL - within functional limits    RANGE OF MOTION AND STRENGTH ASSESSMENT  Upper extremity ROM and strength are within functional limits.  Lower extremity ROM is within functional limits.  Lower extremity strength is within functional limits.    BALANCE  Static Sitting: Good  Dynamic Sitting: Fair +  Static Standing: Fair  Dynamic Standing: Fair -                                                                      AM-PAC '6-Clicks' INPATIENT SHORT FORM - BASIC MOBILITY  How much difficulty does the patient currently have...  Patient Difficulty: Turning over in bed (including adjusting bedclothes, sheets and blankets)?: A Little   Patient Difficulty: Sitting down on and standing up from a chair with arms (e.g., wheelchair, bedside commode, etc.): A Little   Patient Difficulty: Moving from lying on back to sitting on the side of the bed?: A Little   How much help from another person does the patient currently need...   Help from Another: Moving to and from a bed to a chair (including a wheelchair)?: A Little   Help from Another: Need to walk in hospital room?: A Little   Help from Another: Climbing 3-5 steps with a railing?: A Little     AM-PAC Score:  Raw Score: 18   Approx Degree of Impairment: 46.58%   Standardized Score (AM-PAC Scale): 43.63   CMS Modifier (G-Code): CK     FUNCTIONAL ABILITY STATUS  Functional Mobility/Gait Assessment  Gait Assistance: Contact guard assist  Distance (ft): 15  Assistive Device: Rolling walker  Pattern:  Shuffle;L Decreased stance time (slow pace, flexed and guarded posture, step-to pattern, no LOB)  Supine to Sit: contact guard assist for LLE support  Sit to Stand: contact guard assist with RW, VC for safe hand placement and body mechanics, to/from EOB and chair     Exercise/Education Provided:  Bed mobility  Body mechanics  Energy conservation  Functional activity tolerated  Gait training  Posture  Lower therapeutic exercise:  Ankle pumps  Heel slides  LAQ  Quad sets  Sitting knee flexion  Transfer training  WBAT  TKA mobility protocol  Benefits of frequent movement/position changes/ambulation  Safe surgical limb positioning  Ice application  Role of PT/PT plan of care    Pt received resting in bed and agreeable to activity.  at bedside. Introduced self and role. Above education provided. Pt with c/o moderate L knee pain this date. Required increased time and CGA for bed mobility. STS transfers with RW and CGA. Tolerated static standing with RW and CGA, the progressed to marching in place. Pt ambulated short distance in room with RW and CGA. No LOB. Activity limited due to pain. Pt was left sitting in chair with needs within reach, ice applied, handoff to RN complete.     The patient's Approx Degree of Impairment: 46.58% has been calculated based on documentation in the [facility] '6 clicks' Inpatient Basic Mobility Short Form.  Research supports that patients with this level of impairment may benefit from home with  PT.  Final disposition will be made by interdisciplinary medical team.    Patient End of Session: Up in chair;Call light within reach;Needs met;RN aware of session/findings;All patient questions and concerns addressed;Ice applied;Family present    CURRENT GOALS  Goals to be met by: 6/24/24  Patient Goal Patient's self-stated goal is: go home   Goal #1 Patient is able to demonstrate supine - sit EOB @ level: modified independent     Goal #1   Current Status    Goal #2 Patient is able to demonstrate  transfers Sit to/from Stand at assistance level: supervision     Goal #2  Current Status    Goal #3 Patient is able to ambulate 150 feet with assistive device at assistance level: supervision   Goal #3   Current Status    Goal #4 Patient will negotiate 4 stairs/one curb w/ assistive device and supervision   Goal #4   Current Status    Goal #5  AROM 0 degrees extension to 95 degrees flexion     Goal #5   Current Status    Goal #6 Patient independently performs home exercise program for ROM/strengthening per the instructions provided in preparation for discharge.   Goal #6  Current Status      Patient Evaluation Complexity Level:  History Low - no personal factors and/or co-morbidities   Examination of body systems Low -  addressing 1-2 elements   Clinical Presentation Low- Stable   Clinical Decision Making  Low Complexity     Therapeutic Activity:  24 minutes

## 2024-06-18 NOTE — OPERATIVE REPORT
OPERATIVE REPORT    PROCEDURE DATE  6/18/2024    ATT SURGEON  Rishabh Arita MD    ASSISTANT SURGEON  Krishna Walker MD Fellow. (No qualified resident was available to assist with this case; we will thus bill for Krishna Walker MD)    HOUSE-STAFF SURGEON  None.    ASSISTANT  Mukesh Liriano, surgical assist.    PREOPERATIVE DIAGNOSIS  Degenerative joint disease left knee    POSTOPERATIVE DIAGNOSIS  Same    PROCEDURE  Cemented left total knee arthroplasty.    ANESTHESIA  General    PROCEDURE POSITION  Supine.    PREOPERATIVE ANTIBIOTICS  Ancef 2 g IV within 60 minutes of procedure start.    ESTIMATED BLOOD LOSS  25 mL.    TOURNIQUET TIME  101 minutes at 250 mmHg to left lower extremity.    DRAINS  None     SPECIMEN SENT TO PATHOLOGY  Removed Bone cuts.    IMPLANTS  1. Naif Persona Tibia, Size E  2. Naif Persona Cemented All Poly Patella, 32 mm diameter  3. Naif Persona CR, Size 6  4. Naif Persona Vivacit-E Highly Crosslinked Polyethylene, Medial Congruent, 12 mm thickness    COMPLICATIONS  None apparent.    FINDINGS  Consistent with left knee degenerative joint disease in all three compartments.    INDICATIONS  Grace Silva is a 68 year old female who has been followed by the orthopedic surgery service for left -sided knee pain. Grace Silva was previously seen and evaluated in the orthopedic clinic and diagnosed with severe knee degenerative joint disease. After nonoperative treatment measures such as physical therapy, activity modification, weight loss, and intra-articular steroid injections failed to improved the patient's symptoms, Grace Silva wished to proceed with surgery and was therefore scheduled for the above-described procedure on an elective basis.    TECHNIQUE  Grace Silva was identified and greeted in the preoperative holding area and was identified using medical record number, name, and date of birth, all of which were confirmed. The operative knee was marked using an indelible  marker and informed consent was verbally confirmed. The patient had previously signed a consent in clinic. Once again, all risks and benefits as well as alternatives to surgical intervention were discussed with the patient in detail and all the questions were answered. Risks discussed included but were not limited to pain, infection, bleeding, scarring, stiffness, thromboembolic events, severe limb dysfunction, loss of limb, and loss of life. The patient verbally confirmed the consent and wished to proceed with surgery as scheduled.    The anesthesia service then proceeded with spinal and epidural anesthesia. The patient was then taken to the operating room and placed on the operating room table in supine position. Care was taken to pad all bony prominences well. A tourniquet was applied, but not yet inflated to her proximal thigh. The leg was prepped and draped in the standard orthopedic fashion using ChloraPrep. A preprocedural timeout was then performed once again confirmed the patient's correct identity, correct procedure, correct side, and correct site. In addition, allergies and required equipment were reviewed. Three independent members of the operating room team agreed on the aforementioned parameters.     After exsanguination using an esmarch wrap the tourniquet was then inflated, and the knee was brought into flexion and a standard anterior midline incision was made centered over the patella extending from just medial to the tibial tubercle to about 2 fingerbreadths proximal to the superior pole of the patella. The incision was carried down sharply through the subcutaneous tissue until the extensor mechanism was identified. A full-thickness medial and lateral skin flap was then created and a standard medial parapatellar arthrotomy was performed using a Bovie. A medial release was then performed in the usual fashion and the fat pad was removed under careful protection of the patellar tendon using a  Kari.    Next, the ACL, PCL as well as the anterior horn of the lateral meniscus were recessed. The distal femur was then opened using the standard drill equipment and the distal femoral cutting guide was placed using an intramedullary alignment guide set at 5 degrees of valgus. It was pinned in place, and the distal femoral cut was made in the usual fashion using an oscillating saw under careful protection of MCL and LCL.    Attention was then turned to the tibia and using an extramedullary alignment guide, 10 mm were taken off the high side of the tibial plateau. This was once again done under protection of MCL and LCL. Large osteophytes on the tibia were then carefully removed using a small rongeur, and the tibia was sized. Any meniscal remnants were then carefully removed under direct visualization.    The distal femur was then sized using a standard sizing equipment. The distal femoral cutting block was then placed under the distal femur and position was confirmed using Midland's line and direct visualization of the transepicondylar axis. The anterior, posterior, and chamfer cuts were made in the usual fashion using an oscillating saw once again under careful protection of the MCL and LCL.     Trial components were then placed using a Naif Persona components. This was found to achieve full knee extension and 135 degrees of flexion. The knee was found to be stable throughout the range of motion to varus and valgus stress as well as anterior and posterior drawer and flexion.    Attention was then turned to the patella. The patella was measured using the caliper. The patella was then resurfaced using an oscillating saw under careful protection of both quadriceps and patellar tendon and was sized.  The patellar lug holes were then drilled in the usual fashion. A patellar trial was placed. With the knee range of motion, the patella was found to track well without liftoff or tilt.     Accordingly, all trial  components were removed and the tibial preparation was finalized using a drill and punch. All bony cuts were then copiously irrigated using a pulse lavage system and dried in preparation of the cementation. The components were cemented into place starting with the tibia, followed by the femur and application of a polyethylene trial. Throughout the cementation process, any excess cement was carefully removed using a tonsil and the knee was then left in full extension until the cement was cured and the patella was cemented into place and patellar clamp was applied. Again, the entire knee was inspected for any remaining cement parts, which were carefully removed.     While the cement cured, the knee was copious irrigated using normal saline and a pulse lavage system. The polyethylene trial was then exchanged for the final polyethylene liner. The knee was one final time inspected for any cement particles left behind, none of which were found. The knee was then closed in a layered fashion using #1 vicryl for the extensor mechanism at the corners and #2 quill, followed by a 2-0 Vicryl in a simple interrupted suture-type fashion for subcutaneous closure and staples for skin. A Prevena negative pressure dressing was then applied. The tourniquet was then let down after 101 minutes. The leg was then wrapped with cast padding followed by Ace wrap.    At the end of the procedure, all sponge, needle, instrument, and scalpel counts were performed and found to be correct.    Attending physician Dr. Rishabh Arita was scrubbed and present for all key parts of the procedure. He supervised the entire procedure.  Pt was transferred onto a regular bed and brought to the PACU in stable condition.    POSTOPERATIVE PLAN  -Admit to orthopaedics  -Pain control  -DVT ppx: mechanical + anticoagulation  -WBAT  -PT on POD #0  -Medicine co-management        Krishna Walker MD  Fellow - Adult Reconstruction  Department of Orthopaedic  Surgery  6/18/2024  11:21 AM

## 2024-06-18 NOTE — INTERVAL H&P NOTE
History and Physical: Brief Update    I have reviewed the history and physical performed within the last 30 days.  I agree with this assessment and have no further additions.  The patient was seen and examined in the pre-operative area, and deny any changes to their health or medications since the time of the pre-operative H&P. The consent form is signed and present in the chart.  The patient feels well and desires to proceed with the planned operative intervention.    Krishna Walker MD  Fellow, Adult Joint Replacement and Reconstruction  [facility]  Please use Epic chat for clinical communication    06/18/24, 07:15

## 2024-06-18 NOTE — ANESTHESIA POSTPROCEDURE EVALUATION
Patient: Grace Silva    Procedure Summary       Date: 06/18/24 Room / Location: [facility] MAIN OR  / [facility] MAIN OR    Anesthesia Start: 0859 Anesthesia Stop: 1123    Procedure: Left total knee arthroplasty (Left: Knee) Diagnosis: (Degenerative joint disease left knee)    Surgeons: Rishabh Arita MD Anesthesiologist: Andres Winters MD    Anesthesia Type: spinal ASA Status: 2            Anesthesia Type: spinal    Vitals Value Taken Time   /93 06/18/24 1123   Temp 98.8 °F (37.1 °C) 06/18/24 1122   Pulse 86 06/18/24 1123   Resp 14 06/18/24 1123   SpO2 98 % 06/18/24 1123   Vitals shown include unfiled device data.    [facility] AN Post Evaluation:   Patient Evaluated in PACU  Patient Participation: complete - patient participated  Level of Consciousness: awake  Pain Score: 3  Pain Management: adequate  Airway Patency:patent  Dental exam unchanged from preop  Yes    Cardiovascular Status: acceptable  Respiratory Status: acceptable  Postoperative Hydration acceptable    GIBRAN COMBS CRNA  6/18/2024 11:23 AM

## 2024-06-18 NOTE — ANESTHESIA PROCEDURE NOTES
Airway  Date/Time: 6/18/2024 9:29 AM  Urgency: Elective    Airway not difficult    General Information and Staff    Patient location during procedure: OR  Anesthesiologist: Andres Winters MD  Resident/CRNA: Luz Keith CRNA  Performed: CRNA   Performed by: Luz Keith CRNA  Authorized by: Andres Winters MD      Indications and Patient Condition  Indications for airway management: anesthesia  Sedation level: deep  Preoxygenated: yes  Patient position: sniffing  Mask difficulty assessment: 1 - vent by mask    Final Airway Details  Final airway type: supraglottic airway      Successful airway: classic  Size 4       Number of attempts at approach: 1    Additional Comments  Dental exam unchanged from pre op

## 2024-06-18 NOTE — CM/SW NOTE
06/18/24 1500   CM/SW Referral Data   Referral Source Physician   Reason for Referral Discharge planning   Informant Patient   Medical Hx   Does patient have an established PCP? Yes  (Andrew Chacon)   Patient Info   Patient's Current Mental Status at Time of Assessment Alert;Oriented   Patient's Home Environment House   Patient lives with Spouse/Significant other   Patient Status Prior to Admission   Independent with ADLs and Mobility Yes   Discharge Needs   Anticipated D/C needs Home health care     Pt discussed during nursing rounds. Sx left TKA. Home w/spouse, independent w/o device at baseline. Pt is pre-ortho with [facility], referral sent by NANCY Redman in [facility]. Agency reserved in [facility] and notified of probable dc tomorrow. Wednesday SW/CM will need to send signed F2F/order from [facility] once available. PT jen pending.    1800: PT recommending HH w/PT at dc.     Plan: Home w/spouse with [facility] pending medical clearance.     / to remain available for support and/or discharge planning.      DANY CrowN    [phone]

## 2024-06-18 NOTE — PLAN OF CARE
Received patient from PACU, awake and alert. N/T to BLE. Denies pain to knee. Prevena intact and functioning. Ice gel pack to knee. Check void. Denies chest pain, SOB, or nausea. IV fluids continued. Blood glucose checks ac and hs. SCD's in place. Call light is within reach. Fall precautions in place. Plan for discharge home tomorrow when cleared.       Problem: Patient Centered Care  Goal: Patient preferences are identified and integrated in the patient's plan of care  Description: Interventions:  - What would you like us to know as we care for you?   - Provide timely, complete, and accurate information to patient/family  - Incorporate patient and family knowledge, values, beliefs, and cultural backgrounds into the planning and delivery of care  - Encourage patient/family to participate in care and decision-making at the level they choose  - Honor patient and family perspectives and choices  Outcome: Progressing     Problem: Diabetes/Glucose Control  Goal: Glucose maintained within prescribed range  Description: INTERVENTIONS:  - Monitor Blood Glucose as ordered  - Assess for signs and symptoms of hyperglycemia and hypoglycemia  - Administer ordered medications to maintain glucose within target range  - Assess barriers to adequate nutritional intake and initiate nutrition consult as needed  - Instruct patient on self management of diabetes  Outcome: Progressing     Problem: Patient/Family Goals  Goal: Patient/Family Long Term Goal  Description: Patient's Long Term Goal:     Interventions:  -   - See additional Care Plan goals for specific interventions  Outcome: Progressing  Goal: Patient/Family Short Term Goal  Description: Patient's Short Term Goal:     Interventions:   -   - See additional Care Plan goals for specific interventions  Outcome: Progressing

## 2024-06-18 NOTE — CM/SW NOTE
Department  notified of request for HHC, aidin referrals started. Assigned CM/SW to follow up with pt/family on further discharge planning.     Юлия Miguel, DSC

## 2024-06-18 NOTE — PROGRESS NOTES
[facility]  part of [facility]    Ortho Medical Progress Note     Grace Silva Patient Status:  Inpatient    1955 MRN [id number]   Location [facility] Attending Rishabh Arita MD   Hosp Day # 0 PCP Andrew Chacon MD       Subjective:   Grace Silva is a(n) 68 year old female post operative left knee arthroplasty per Dr. Arita post op day # 0.  Denies nausea and vomiting, dizziness.  She feels light headed from anesthesia.  WBAT.  The pain is  after treated.  Family here.    Objective:   Vital Signs:  Blood pressure 135/74, pulse 88, temperature 99 °F (37.2 °C), temperature source Oral, resp. rate 14, height 5' 3\" (1.6 m), weight 169 lb (76.7 kg), SpO2 97%.     General: No acute distress. Appropriate   HEENT: Moist mucous membranes. PERRL  Respiratory: Clear to auscultation bilaterally.  No wheezes. No rhonchi.   Cardiovascular: S1, S2.  Regular rate and rhythm.    Abdomen: Soft, nontender, nondistended.  Positive bowel sounds   Neurologic: Oriented and alert. Sensation present in bilateral lower extremities   :  no ortega  Musculoskeletal: Surgical dressing dry and intact.  No calf tenderness.    Skin: No lesions. No erythema. Color normal  Psychiatric: Appropriate mood and affect.      Assessment and Plan:     Pre op labs reviewed prior to the procedure        Primary osteoarthritis of one knee, left  POD #0      Asthma (HCC)        Essential hypertension        Hyperlipidemia        Generalized anaphylaxis, sequela        Leg cramps        Arthritis of left knee        Prediabetes        XR KNEE (1 OR 2 VIEWS), LEFT (CPT=73560)    Result Date: 2024  CONCLUSION:  1. Total left knee arthroplasty.    Dictated by (CST): Jose Decker MD on 2024 at 12:51 PM     Finalized by (CST): Jose Decker MD on 2024 at 12:52 PM                 PT/OT: per ortho  Pain Control: per ortho -  Incentive Spirometry  DVT Prophylaxis - ASA  GI Prophylaxis -  Home  medications reconciled     The patient was discussed with Dr. Garcias.  We are following for medical assessment by monitoring respiratory status, hemodynamics, GI status, reviewing labs, and assisting with anticoagulation monitoring as patient appropriate.          Is this a shared or split note between Advanced Practice Provider and Physician? No     ZOILA Davis  Office [phone]  Cell [phone]  6/18/2024

## 2024-06-18 NOTE — DISCHARGE INSTRUCTIONS
Sometimes managing your health at home requires assistance.  The [facility] team has recognized your preference to use [facility], Phone: [phone] or Fax: [phone]. A representative from the [place] health agency will contact you or your family to schedule your first visit.               Frequently Asked Questions after  Total Hip/Knee Arthroplasty  Dr. Rishabh Arita- Surgeon  Annika Frey - Nurse Practitioner   Aicha Moore -   [phone]    [facility] agency: [facility] [phone]  If they haven't contacted you already, they should call you once you return home. If you have not had a call by 11am, please call the number above.     When should I follow up with Dr. Arita's team?  You should follow up with Dr. Arita/ his Nurse Practitioner at 2weeks and 6 weeks after your surgery.  These appointments should have been made at the time you scheduled your surgery, and the dates/times should be in your [facility] information booklet and in your provider follow up.  If you aren't sure about this date, please call our office [phone]. Your     Is swelling normal?  YES! We anticipate swelling, but this can be managed well with ice and elevation.  Please ice/elevate 4 times per day for 30 minutes at a minimum. When elevating please make sure the surgical leg is higher than the heart. A good way to do this is to lay completely flat and put the leg on an arm rest of your couch with a few pillows under the ankle. Please make sure the leg is straight when elevating.   If you are short of breath or have calf pain please call us or go to the ER before elevating the leg.    How should I take care of my incision and dressing?  The gauze dressing should be removed after 7 days unless saturated before then.   If dressing becomes saturated before the 7 day jonathon, please remove the gauze dressing sooner.  Stapled incisions: once the gauze dressing  is removed, replaced with an ABD pad. Change daily.    How long am I going to be on a blood thinner?  You will be on a blood thinner (warfarin, aspirin, etc) for one month from your surgical date to prevent blood clots. Your medication is specified on your medication list.  If you were taking a blood thinner prior to surgery, you will continue this per the recommendation of the prescribing doctor.    How often should I have physical therapy after surgery?  If you are going directly to outpatient physical therapy:  Hip replacement- 3x/ week for 2 weeks  If you have home health care, this will be for a total of two weeks  Hip replacement- 3x/ week     How long should I be taking my medications?  Continue to take your blood thinner (ie aspirin, coumadin/ warfarin, lovenox) and pantoprazole for 1 month from surgery.  If you were prescribed an anti-inflammatory medication (celecoxib, meloxicam, ibuprofen etc.), please continue to take this while on your blood thinner. Your pharmacist may instruct you differently. You will be on this for at least 6 weeks.  Continue your stool softener (colace) as long as your are taking narcotic pain medication.    What are the signs of infection I should look for?  It is common for the knee and hip to be red and warm after surgery  Our suspicion for infection rises with any of the followin.  There is pus like discharge from the incision  2.  Your temperature is over 101 degrees  3.  It is painful to move the leg through a gentle range of motion  IF ANY OF THESE THREE OCCUR PLEASE CONTACT OUR OFFICE IMMEDIATELY or if it is after hours, please dial “zero” to talk to the physician on call when you hear the voicemail.      When can I drive?  You can drive when you are off all narcotic pain medications (including Norco, Oxycontin, oxycodone).  You must also feel that you are capable of driving safely; meaning you can push on the gas and brake with force if needed.    When can I  shower?  The incision is covered with a cotton dressing (ABD). Please protect the incision with Saran wrap (brand name only) over the dressing and tape occlusively.  The Saran wrap is “water resistant” only.   DO NOT position yourself with the incisional area directly in the stream of water.  After shower, remove the Saran wrap and change your dressing. Change dressing daily.  If you have a Prevena vac dressing, you will need to cover the dressing AND vac if you are going to shower.   If you have staples, please use Saran Wrap over the surgical wound when showering. It should NOT get wet while staples are in place.  When can I go in a hot tub/bath/pool?  3 months from your surgery if you incision has COMPLETELY healed (no scabs or open areas)    How do I get a refill on my pain medications if necessary before my next appointment?  Oxycodone and Norco (hydrocodone- acetaminophen) require an electronic or physical prescription. Please phone the office at [phone] if you need a refills on medications as these need to be electronically prescribed.   Please understand that the requests will be taken care of as soon as possible but if it is the weekend they may not be refilled until Monday. Please allow 24- 48 hours on refill requests.    How do I get a handicap placard?  We are happy to provide you with ONE handicap placard at the time of your surgery which will be good for six months.  Please ask our office at your pre-op or post-op appointment if you feel you will need a placard.  If you feel you need a handicap placard for longer than 6 months from surgery please contact your primary care physician.    What do I do after home health has discharged me?  We do not routinely prescribe outpatient physical therapy for our hip patients. We will evaluate your progress at your 2 week appointment and determine then if therapy will be necessary. Otherwise, your home health physical therapist should teach you a home  exercise program.     If you have any questions related to medical issues unrelated to your knee or hip, please contact the physician that cleared you for surgery:     If you have questions pertaining to the surgery, please feel free to contact our office:   Phone # [phone]  Fax # [phone]    If you have arranged for outpatient physical therapy upon your discharge from the hospital, you will need to remove the gauze surgical dressing on post operative day 7. You should have received instructions on how to properly do this before you left the hospital.     Dressing change instructions:     Prevena Incision Management System    What is Prevena?  Prevena is a vacuum device that helps your wound heal quickly and protects it from infection. It helps hold the edges of the wound together and removes fluid from the area.    How long do I keep Prevena on incision?  The Prevena stays in place for 7 days. Keep it powered on for 24 hours a day, except when:  -You are bleeding or leaking fluid around the dressing  -The canister is full or broken  -You are removing it    The home health nurse will remove the dressing in 7 days.     Keep Prevena powered on when sleeping but put it where it won't fall or drop on to the ground and where tubing won't kink or pinch.  You can shower but keep the vacuum piece away from direct water streams.     Prevena alarms if:  -There is low battery  -Leak in the system  -Full canister    You can call a Prevena specialist if you have any questions or concerns [phone]    The gauze dressing is to be removed on post-op day 7, unless the dressing is not adhering properly--in which case the dressing is to be changed sooner.  To remove the dressing, see instructions below.      Shower instructions:  Stapled incisions: the incision may NOT get wet in the shower after the gauze has been removed. Please wrap the your surgical wound in Saran Wrap when showering.    Call your surgeon's  office if:  The dressing will not stay in place  If there is any skin issue such as tearing or blistering  More than 50% of the dressing becomes saturated  There is a large amount of fluid coming from the incision  You experience unusual pain or odor    Ice/elevate:  Please ice/elevate 4 times per day for 30 minutes at a minimum.  When elevating please make sure the surgical leg is higher than the heart.  A good way to do this is to lay completely flat and put the leg on an arm rest of your couch with a few pillows under the ankle.  Please make sure the leg is straight when elevating.  If you are short of breath or have calf pain please call us or go to the ER before elevating the leg.    Orthopaedic Discharge Bowel Program while on Pain Medications (Opiods)    Docusate sodium (Colace) 100 mg after breakfast and at bedtime.  Polyethylene glycol (Glycolax or Miralax)17 Gm Daily.  Mix well in glass of water.  Hold both docusate sodium (Colace) and polyethylene glycol (Miralax) if greater than three stools per day.  If no stool after two days at home take senna (Senakot) 1-2 tablets at bedtime.  Repeat nightly untill stool occurs.      Continue program as long as continuing opioids.    PLEASE TAKE YOUR MEDICATIONS AS DIRECTED. YOU SHOULD HAVE RECEIVED YOUR PRESCRIPTIONS BEFORE SURGERY:       (Estimated schedule)              -Xarelto 10mg - take 1 tablet by mouth daily as directed                                                                 (6am)  -Acetaminophen 500mg - take 2 tablets by mouth every 8 hours as directed   (6am, 2pm, 10pm)    -[id number] - take 1 tablet by mouth once daily with food as directed    (6am)    -Gabapentin 100mg - take 2 capsules by mouth every 8 hours as directed    (6am, 2pm, 10pm)    -Tramadol 50mg - take 2 tablets by mouth every 8 hours as directed    (6am, 2pm, 10pm)  -Oxycodone 5mg - take 1 tablet by mouth every 4 hours as needed for breakthrough pain    -Senna Plus 8.6mg-50mg  capsule - 2 capsules by mouth twice a day as needed for constipation  -Ondansetron HCL 4mg - take 1 tablet by mouth every 8 hours as needed for nausea    If you have any questions about your medications, please call our office [phone].

## 2024-06-18 NOTE — ANESTHESIA PROCEDURE NOTES
Spinal Block    Date/Time: 6/18/2024 9:05 AM    Performed by: Luz Keith CRNA  Authorized by: Andres Winters MD      General Information and Staff    Start Time:  6/18/2024 9:05 AM  End Time:  6/18/2024 9:12 AM  Anesthesiologist:  Andres Winters MD  CRNA:  Luz Keith CRNA  Performed by:  CRNA and anesthesiologist  Site identification: surface landmarks    Reason for Block: at surgeon's request and surgical anesthesia    Preanesthetic Checklist: patient identified, IV checked, risks and benefits discussed, monitors and equipment checked, pre-op evaluation, timeout performed, anesthesia consent and sterile technique used      Procedure Details    Patient Position:  Sitting  Prep: ChloraPrep    Monitoring:  Heart rate and continuous pulse ox  Approach:  Midline  Location:  L3-4  Injection Technique:  Single-shot    Needle    Needle Type:  Pencil-tip  Needle Gauge:  24 G    Assessment    Sensory Level:   Events: clear CSF, failed spinal, CSF aspirated and well tolerated      Additional Comments

## 2024-06-19 VITALS
RESPIRATION RATE: 14 BRPM | DIASTOLIC BLOOD PRESSURE: 68 MMHG | WEIGHT: 169 LBS | SYSTOLIC BLOOD PRESSURE: 134 MMHG | HEART RATE: 70 BPM | HEIGHT: 63 IN | OXYGEN SATURATION: 95 % | TEMPERATURE: 98 F | BODY MASS INDEX: 29.95 KG/M2

## 2024-06-19 PROBLEM — M17.12 PRIMARY OSTEOARTHRITIS OF ONE KNEE, LEFT: Status: RESOLVED | Noted: 2024-06-07 | Resolved: 2024-06-19

## 2024-06-19 PROBLEM — R25.2 LEG CRAMPS: Status: RESOLVED | Noted: 2024-06-07 | Resolved: 2024-06-19

## 2024-06-19 PROBLEM — Z96.652 STATUS POST TOTAL LEFT KNEE REPLACEMENT: Status: ACTIVE | Noted: 2024-06-19

## 2024-06-19 LAB
ANION GAP SERPL CALC-SCNC: 8 MMOL/L (ref 0–18)
BUN BLD-MCNC: 14 MG/DL (ref 9–23)
BUN/CREAT SERPL: 20.9 (ref 10–20)
CALCIUM BLD-MCNC: 9.1 MG/DL (ref 8.7–10.4)
CHLORIDE SERPL-SCNC: 105 MMOL/L (ref 98–112)
CO2 SERPL-SCNC: 29 MMOL/L (ref 21–32)
CREAT BLD-MCNC: 0.67 MG/DL
EGFRCR SERPLBLD CKD-EPI 2021: 96 ML/MIN/1.73M2 (ref 60–?)
GLUCOSE BLD-MCNC: 125 MG/DL (ref 70–99)
GLUCOSE BLDC GLUCOMTR-MCNC: 124 MG/DL (ref 70–99)
GLUCOSE BLDC GLUCOMTR-MCNC: 185 MG/DL (ref 70–99)
HCT VFR BLD AUTO: 33.8 %
HGB BLD-MCNC: 11.3 G/DL
OSMOLALITY SERPL CALC.SUM OF ELEC: 296 MOSM/KG (ref 275–295)
POTASSIUM SERPL-SCNC: 3.8 MMOL/L (ref 3.5–5.1)
SODIUM SERPL-SCNC: 142 MMOL/L (ref 136–145)

## 2024-06-19 PROCEDURE — 82962 GLUCOSE BLOOD TEST: CPT

## 2024-06-19 PROCEDURE — 85014 HEMATOCRIT: CPT | Performed by: ORTHOPAEDIC SURGERY

## 2024-06-19 PROCEDURE — 85018 HEMOGLOBIN: CPT | Performed by: ORTHOPAEDIC SURGERY

## 2024-06-19 PROCEDURE — 97165 OT EVAL LOW COMPLEX 30 MIN: CPT

## 2024-06-19 PROCEDURE — 80048 BASIC METABOLIC PNL TOTAL CA: CPT | Performed by: ORTHOPAEDIC SURGERY

## 2024-06-19 PROCEDURE — 36415 COLL VENOUS BLD VENIPUNCTURE: CPT | Performed by: ORTHOPAEDIC SURGERY

## 2024-06-19 PROCEDURE — 97110 THERAPEUTIC EXERCISES: CPT

## 2024-06-19 PROCEDURE — 97116 GAIT TRAINING THERAPY: CPT

## 2024-06-19 PROCEDURE — 97530 THERAPEUTIC ACTIVITIES: CPT

## 2024-06-19 PROCEDURE — 97535 SELF CARE MNGMENT TRAINING: CPT

## 2024-06-19 RX ORDER — TRAMADOL HYDROCHLORIDE 50 MG/1
50 TABLET ORAL EVERY 6 HOURS SCHEDULED
Status: SHIPPED | COMMUNITY
Start: 2024-06-19

## 2024-06-19 RX ORDER — OXYCODONE HYDROCHLORIDE 5 MG/1
5 TABLET ORAL EVERY 4 HOURS PRN
Status: SHIPPED | COMMUNITY
Start: 2024-06-19

## 2024-06-19 RX ORDER — ACETAMINOPHEN 500 MG
1000 TABLET ORAL EVERY 8 HOURS SCHEDULED
Status: SHIPPED | COMMUNITY
Start: 2024-06-19

## 2024-06-19 NOTE — OCCUPATIONAL THERAPY NOTE
OCCUPATIONAL THERAPY EVALUATION - INPATIENT     Room Number: Room 5/Room 5-A  Evaluation Date: 6/19/2024  Type of Evaluation: Initial  Presenting Problem: LT TKA    Physician Order: IP Consult to Occupational Therapy  Reason for Therapy: ADL/IADL Dysfunction and Discharge Planning    OCCUPATIONAL THERAPY ASSESSMENT   Patient is a 68 year old female admitted 6/18/2024 for LT TKA.  Prior to admission, patient's baseline is independent, working from home, driving. Spouse is retired and available to assist as needed..  Patient is currently functioning near baseline with no further skilled OT needs identified.       PLAN  Patient has been evaluated and presents with no skilled Occupational Therapy needs  at this time.  Patient will be discharged from Occupational Therapy services. Please re-order if a new functional limitation presents during this admission.     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     SUBJECTIVE  \"\"I was still able to walk over 10,000 steps\"- referring to PLOF    OCCUPATIONAL THERAPY EXAMINATION    OBJECTIVE  Precautions: -- (Provena pressure dressing)  Fall Risk: Standard fall risk    WEIGHT BEARING RESTRICTION  L Lower Extremity: Weight Bearing as  Tolerated    PAIN ASSESSMENT  Rating: -- (minimal)  Location: LT knee/thigh  Management Techniques: Relaxation; Repositioning; Ice    ACTIVITY TOLERANCE  Performs all requested bed side/in room functional tasks with minimal pain, no SOB    Behavioral/Emotional/Social: pleasant and motivated    RANGE OF MOTION   Upper extremity ROM is within functional limits     STRENGTH ASSESSMENT  Upper extremity strength is within functional limits       ACTIVITIES OF DAILY LIVING ASSESSMENT  AM-PAC ‘6-Clicks’ Inpatient Daily Activity Short Form  How much help from another person does the patient currently need…  -   Putting on and taking off regular lower body clothing?: None  -   Bathing (including washing, rinsing, drying)?: None  -   Toileting, which includes using toilet, bedpan or urinal? : None  -   Putting on and taking off regular upper body clothing?: None  -   Taking care of personal grooming such as brushing teeth?: None  -   Eating meals?: None    AM-PAC Score:  Score: 24  Approx Degree of Impairment: 0%  Standardized Score (AM-PAC Scale): 57.54  CMS Modifier (G-Code): CH    FUNCTIONAL TRANSFER ASSESSMENT  Sit to Stand: Edge of Bed  Edge of Bed: Independent  Toilet Transfer: Supervision  Simulated Car Transfer: Supervision    BED MOBILITY  Rolling: Independent  Supine to Sit : Independent  Sit to Supine (OT): Independent (using UEs to lift LT LE onto bed)      FUNCTIONAL ADL ASSESSMENT  Pt performs UE and LE dressing with set up demo good functional reach and maintaining proper body mechanics.   Pt manges toileting and supported standing for grooming tasks supervision/mod I using R/W    Skilled Therapy Provided: Pt received long sitting in bed with supportive  at bed side. Pt and spouse deny any discharge concerns or needs with plans to discharge home today. Pt demo good problem solving and is able to demo carry over of instruction.     EDUCATION PROVIDED  Patient: Role of Occupational Therapy; Plan of Care;  Discharge Recommendations; DME Recommendations; Functional Transfer Techniques; Posture/Positioning; Compensatory ADL Techniques; Proper Body Mechanics  Patient's Response to Education: Verbalized Understanding; Returned Demonstration  Family/Caregiver's Response to Education: Verbalized Understanding    The patient's Approx Degree of Impairment: 0% has been calculated based on documentation in the [facility] '6 clicks' Inpatient Daily Activity Short Form.  Research supports that patients with this level of impairment have no further OT needs.  Final disposition will be made by interdisciplinary medical team.    Patient End of Session: In bed;Needs met;Call light within reach;RN aware of session/findings;All patient questions and concerns addressed;Family present      Patient Evaluation Complexity Level:   Occupational Profile/Medical History LOW - Brief history including review of medical or therapy records    Specific performance deficits impacting engagement in ADL/IADL LOW  1 - 3 performance deficits    Client Assessment/Performance Deficits LOW - No comorbidities nor modifications of tasks    Clinical Decision Making LOW - Analysis of occupational profile, problem-focused assessments, limited treatment options    Overall Complexity LOW       Self-Care Home Management: 15 minutes

## 2024-06-19 NOTE — CM/SW NOTE
Patient failed inpatient criteria. Second level of review completed by Surgical UR  and supports Outpatient status   UR committee in agreement. Discussed with Dr. Mcintosh  who approves Outpatient status.  MOON  notice explained and  provided  to the patient .    Priscila GOLD, Utilization Review   Ext [phone]

## 2024-06-19 NOTE — PLAN OF CARE
Alert and oriented x4. Admitted for left total knee. ACHS. 1 assist walker. Voiding. Provena wound vac. Pain control. Plan for home once cleared  Problem: Patient Centered Care  Goal: Patient preferences are identified and integrated in the patient's plan of care  Description: Interventions:  - What would you like us to know as we care for you? Home with spouse  - Provide timely, complete, and accurate information to patient/family  - Incorporate patient and family knowledge, values, beliefs, and cultural backgrounds into the planning and delivery of care  - Encourage patient/family to participate in care and decision-making at the level they choose  - Honor patient and family perspectives and choices  Outcome: Progressing     Problem: Diabetes/Glucose Control  Goal: Glucose maintained within prescribed range  Description: INTERVENTIONS:  - Monitor Blood Glucose as ordered  - Assess for signs and symptoms of hyperglycemia and hypoglycemia  - Administer ordered medications to maintain glucose within target range  - Assess barriers to adequate nutritional intake and initiate nutrition consult as needed  - Instruct patient on self management of diabetes  Outcome: Progressing     Problem: Patient/Family Goals  Goal: Patient/Family Long Term Goal  Description: Patient's Long Term Goal: pain management    Interventions:  - pain administration  - See additional Care Plan goals for specific interventions  Outcome: Progressing  Goal: Patient/Family Short Term Goal  Description: Patient's Short Term Goal: calling for assistance to bathroom.     Interventions:   - safe ambulation.  - See additional Care Plan goals for specific interventions  Outcome: Progressing

## 2024-06-19 NOTE — PROGRESS NOTES
Face to Face Encounter    I (or a non-physician practitioner working in collaboration with me) had a face to face encounter with this patient during which a medical condition was addressed which is the primary reason for home health care on : 6/19/2024    The encounter was in whole, or in part, for the following medical conditions which is the primary reason for home care. Joint replacement surgery    Based on my findings, the following services are medically necessary home health services (Check all that apply): Nursing, because vital signs monitoring, PT/ INR monitoring and assessment for signs and symptoms of bleeding (if pt on coumadin), wound/ incision assessment, pain assessment and instruction related to pain management, and Physical Therapy, because evaluation of environment for safety (pt is at fall risk), gait training and instruction in the use of assistive devices, instruction and monitoring of therapeutic exercise.    The following clinical findings and patient's condition support that this patient is homebound, because narcotic usage every 4-6 hours, inability to ambulate greater than 150 feet, inability to drive a vehicle, fatigue due to anemia, increased risk for infection and increased risk for bleeding.    Certification for Home Health Services  Based on the above findings, I certify that this patient is confined to the home (meets homebound criteria listed above) and needs intermittent skilled nursing care, physical therapy and /or speech therapy or continues to need occupational therapy.  The patient is under my care, and I have initiated the establishment of the plan of care. This patient will be followed by a physician who will periodically review the plan of care.     Annika Frey  Nurse Practitioner for Dr. Rishabh Arita  P: [phone]  F: [phone]

## 2024-06-19 NOTE — PHYSICAL THERAPY NOTE
PHYSICAL THERAPY KNEE TREATMENT NOTE - INPATIENT     Room Number: Room 5/Room 5-A             Presenting Problem: s/p L TKA on        Problem List  Principal Problem:    Primary osteoarthritis of one knee, left  Active Problems:    Asthma (HCC)    Essential hypertension    Hyperlipidemia    Generalized anaphylaxis, sequela    Leg cramps    Arthritis of left knee    Prediabetes      PHYSICAL THERAPY ASSESSMENT   Patient demonstrates good  progress this session, goals  remain in progress.    Patient continues to function below baseline with bed mobility, transfers, and gait.  Contributing factors to remaining limitations include decreased functional strength, decreased endurance/aerobic capacity, and pain.  Next session anticipate patient to progress bed mobility, transfers, and gait.  Physical Therapy will continue to follow patient for duration of hospitalization.    Patient continues to benefit from continued skilled PT services: at discharge to promote functional independence in home.  Anticipate patient will return home with home health PT.    PLAN  PT Treatment Plan: Bed mobility;Body mechanics;Endurance  Frequency (Obs): BID    SUBJECTIVE  Pt reports being ready for PT RX    OBJECTIVE  Precautions: None    WEIGHT BEARING STATUS           L Lower Extremity: Weight Bearing as Tolerated    PAIN ASSESSMENT   Ratin  Location: left knee/ankle  Management Techniques: Activity promotion;Body mechanics;Repositioning    BALANCE  Static Sitting: Good  Dynamic Sitting: Fair +  Static Standing: Fair  Dynamic Standing: Fair -    ACTIVITY TOLERANCE                         O2 WALK       AM-PAC '6-Clicks' INPATIENT SHORT FORM - BASIC MOBILITY  How much difficulty does the patient currently have...  Patient Difficulty: Turning over in bed (including adjusting bedclothes, sheets and blankets)?: A Little   Patient Difficulty: Sitting down on and standing up from a chair with arms (e.g., wheelchair, bedside commode, etc.):  A Little   Patient Difficulty: Moving from lying on back to sitting on the side of the bed?: A Little   How much help from another person does the patient currently need...   Help from Another: Moving to and from a bed to a chair (including a wheelchair)?: A Little   Help from Another: Need to walk in hospital room?: A Little   Help from Another: Climbing 3-5 steps with a railing?: A Little     AM-PAC Score:  Raw Score: 18   Approx Degree of Impairment: 46.58%   Standardized Score (AM-PAC Scale): 43.63   CMS Modifier (G-Code): CK    FUNCTIONAL ABILITY STATUS  Functional Mobility/Gait Assessment  Gait Assistance: Contact guard assist  Distance (ft): 2 x 50  Assistive Device: Rolling walker  Pattern: L Decreased stance time;Shuffle  Stairs: Stairs  How Many Stairs: 4  Device: 2 Rails  Assist: Contact guard assist  Pattern: Ascend and Descend  Rolling: minimal assist  Supine to Sit: minimal assist  Sit to Supine: minimal assist  Sit to Stand: minimal assist    Additional Information: Am session.Min a for bed mobility and transfer.EOB sitting balance activity with emphasis on core stabilization.Pt amb 2 x 50 ft with RW and CGA.Navigated 4 stairs with CGA.There ex.    The patient's Approx Degree of Impairment: 46.58% has been calculated based on documentation in the [facility] '6 clicks' Inpatient Basic Mobility Short Form.  Research supports that patients with this level of impairment may benefit from Pt reports being ready for PT RX.  Final disposition will be made by interdisciplinary medical team.    Exercises AM Session PM Session   Ankle Pumps 20 reps  reps   Quad Sets 20 reps  reps   Glut Sets 20 reps  reps                                               Comments: Pt participated in group session, tolerance was .    Knee ROM                 Patient End of Session: Up in chair;Call light within reach;RN aware of session/findings;All patient questions and concerns addressed    CURRENT GOALS    Patient Goal Patient's  self-stated goal is: go home   Goal #1 Patient is able to demonstrate supine - sit EOB @ level: modified independent     Goal #1   Current Status Min a   Goal #2 Patient is able to demonstrate transfers Sit to/from Stand at assistance level: supervision     Goal #2  Current Status Min a   Goal #3 Patient is able to ambulate 150 feet with assistive device at assistance level: supervision   Goal #3   Current Status Pt amb 2 x 50 ft with RW and CGa   Goal #4 Patient will negotiate 4 stairs/one curb w/ assistive device and supervision   Goal #4   Current Status Navigated 4 stairs with CGA   Goal #5  AROM 0 degrees extension to 95 degrees flexion     Goal #5   Current Status In progress   Goal #6 Patient independently performs home exercise program for ROM/strengthening per the instructions provided in preparation for discharge.   Goal #6  Current Status In progress     Gait Training: 15 minutes  Therapeutic Activity: 15 minutes  Neuromuscular Re-education:  minutes  Therapeutic Exercise:  minutes  Canalith Repositioning:  minutes  Manual Therapy:  minutes  Can add/delete any of these

## 2024-06-19 NOTE — DISCHARGE SUMMARY
Ortho Discharge Summary     Patient ID:  Grace Silva  [id number]  68 year old  12/28/1955      Admit Date: 6/18/2024    Discharge Date: 6/19/2024    Attending Physician: Rishabh Arita MD     Reason for admission: left knee DJD    Discharge Diagnoses: Degenerative joint disease left knee  Arthritis of left knee    Discharge Condition: good    History of Present Illness:  Grace Silva is a 68 year old female who has been followed by the orthopedic surgery service for left -sided knee pain. Grace Silva was previously seen and evaluated in the orthopedic clinic and diagnosed with severe knee degenerative joint disease. After nonoperative treatment measures such as physical therapy, activity modification, weight loss, and intra-articular steroid injections failed to improved the patient's symptoms, Grace Silva wished to proceed with surgery and was therefore scheduled for the above-described procedure on an elective basis.     All risks, benefits and alternatives for total knee were discussed with the patient and informed consent was obtained for the surgical procedure. The risks discussed included but were not limited to: infection, nerve injury, arterial injury, stiffness, numbness, wear, lysis, need for re-operation, DVT, PE, loss of limb and loss of life. Medical clearance was obtained and there were no contraindications to move forward with surgery on 6/18/2024.      Hospital Course:  The patient was taken to the operating room on the day of admission for Procedure(s): left total knee arthroplasty. The patient tolerated the procedure well and was taken to the post operative room in a stable condition. The patient was noted to be neurovascularly intact post operatively. The post operative films show components in excellent condition.     The patient dressing was checked on post operative day 1 and found to be dry and intact. The PREVENA dressing was left in place. Physical therapy worked with the patient  each day and they performed well. The patient had adequate pain control with oral medication. They were given 24 hours of perioperative antibiotic prophylaxis. The patient was managed in conjunction with the medical team for general medical issues. There were no complications throughout the hospital stay. The patient met physical therapy goals and was discharged.     Rishabh Arita MD was aware of all events throughout the hospital course and supervised this patient's care. The discharge of this patient was coordinated with the discharge planner and was discharged on the medication as listed in medical reconciliation.    Consults: ANCILLARY CONSULT TO CASE MANAGEMENT, internal medicine     Disposition: stable, discharge to home with home health    .     Discharge Medications        START taking these medications        Instructions Prescription details   acetaminophen 500 MG Tabs  Commonly known as: Tylenol Extra Strength      Take 2 tablets (1,000 mg total) by mouth every 8 (eight) hours.   Refills: 0     Naloxone HCl 4 MG/0.1ML Liqd      4 mg by Nasal route as needed. If patient remains unresponsive, repeat dose in other nostril 2-5 minutes after first dose.   Quantity: 1 kit  Refills: 0     oxyCODONE 5 MG Tabs      Take 1 tablet (5 mg total) by mouth every 4 (four) hours as needed.   Refills: 0     Sennosides 17.2 MG Tabs      Take 1 tablet (17.2 mg total) by mouth nightly.   Refills: 0     traMADol 50 MG Tabs  Commonly known as: Ultram      Take 1 tablet (50 mg total) by mouth every 6 (six) hours.   Refills: 0            CONTINUE taking these medications        Instructions Prescription details   atorvastatin 10 MG Tabs  Commonly known as: Lipitor      Take 1 tablet (10 mg total) by mouth nightly.   Refills: 0     Calcium Carbonate 600 MG Tabs      Take 2 tablets (1,200 mg total) by mouth daily.   Refills: 0     hydroxychloroquine 200 MG Tabs  Commonly known as: Plaquenil      Take 1.5 tablets (300 mg total)  by mouth nightly.   Refills: 0     losartan-hydroCHLOROthiazide 50-12.5 MG Tabs  Commonly known as: Hyzaar      Take 1 tablet by mouth daily.   Refills: 0     metFORMIN HCl ER (OSM) 1000 MG (OSM) Tb24  Commonly known as: FORTAMET      Take 2 tablets (2,000 mg total) by mouth daily with breakfast.   Refills: 0     montelukast 10 MG Tabs  Commonly known as: Singulair      Take 1 tablet (10 mg total) by mouth daily.   Refills: 0     Multi-Vitamin/Minerals Tabs      Take 1 tablet by mouth daily.   Refills: 0     spironolactone 25 MG Tabs  Commonly known as: Aldactone      Take 1 tablet (25 mg total) by mouth daily.   Refills: 0     Vitamin D 50 MCG (2000 UT) Tabs      Take by mouth.   Refills: 0               Where to Get Your Medications        These [id number] - [address] AT [facility] [address], [phone], [phone]  [address] [place] [address]      Phone: [phone]   Naloxone HCl 4 MG/0.1ML Liqd             Patient Instructions:     Activity: activity as tolerated and no driving for 2 weeks    Diet: regular     Wound Care: The Prevena vac dressing is \"waterproof\". You may shower with it in place but the vac itself cannot come in direct contact with water. After the Prevena vac dressing has been removed and replaced with a cotton dressing (ABD), protect the incision with Saran wrap (brand name only) over the dressing and tape occlusively.  The Saran wrap is “water resistant” only.   DO NOT position yourself with the incisional area directly in the stream of water.  After shower, remove the Saran wrap and change your dressing. Change dressing daily.    Home health agency: [facility]    JADEN Thakkar  Nurse Practitioner for Dr. Rishabh Arita  P: [phone]

## 2024-06-19 NOTE — PROGRESS NOTES
6/19/2024  Admission date 6/18/2024      S: Patient doing well. Denies any numbness/tingling, F/C/S, N/V/D, SOB, Chest Pain, Abdominal Pain. Pain well-controlled. Ambulated with therapy yesterday.      O:    Vitals:    06/19/24 0514   BP: 141/74   Pulse: 70   Resp: 18   Temp: 98.2 °F (36.8 °C)       Data Review: {  Recent Results (from the past 24 hour(s))   POCT Glucose    Collection Time: 06/18/24  7:50 AM   Result Value Ref Range    POC Glucose  124 (H) 70 - 99 mg/dL   POCT Glucose    Collection Time: 06/18/24  3:12 PM   Result Value Ref Range    POC Glucose  133 (H) 70 - 99 mg/dL   POCT Glucose    Collection Time: 06/18/24  9:13 PM   Result Value Ref Range    POC Glucose  192 (H) 70 - 99 mg/dL   Basic Metabolic Panel (8)    Collection Time: 06/19/24  5:35 AM   Result Value Ref Range    Glucose 125 (H) 70 - 99 mg/dL    Sodium 142 136 - 145 mmol/L    Potassium 3.8 3.5 - 5.1 mmol/L    Chloride 105 98 - 112 mmol/L    CO2 29.0 21.0 - 32.0 mmol/L    Anion Gap 8 0 - 18 mmol/L    BUN 14 9 - 23 mg/dL    Creatinine 0.67 0.55 - 1.02 mg/dL    BUN/CREA Ratio 20.9 (H) 10.0 - 20.0    Calcium, Total 9.1 8.7 - 10.4 mg/dL    Calculated Osmolality 296 (H) 275 - 295 mOsm/kg    eGFR-Cr 96 >=60 mL/min/1.73m2   Hemoglobin & Hematocrit    Collection Time: 06/19/24  5:35 AM   Result Value Ref Range    HGB 11.3 (L) 12.0 - 16.0 g/dL    HCT 33.8 (L) 35.0 - 48.0 %     Gen:  A&O x 3, VSS, Afebrile    Abd: Soft Nontender    Lower Extremity:   -  Dressing clean, dry, intact. PREVENA to suction  -  2+ Pedal pulses Bilaterally  -  Tibialis Anterior/Gastroc and Soleus/ Extensor Halluses Longus motor in tact  -  Sensory in tact in sural/Saphenous/tibial/superficial peroneal/deep peroneal nerve distributions  -  Calf soft/non-tender with no palpable cords          A/P: S/P TKA 1 Day Post-Op LTKA    1. Weight Bearing : WBAT  2. Deep Venous thrombosis prophylaxis - Compression stocking/Xarelto  3. Continue Physical Therapy, Mobilize out of bed  4.  Pain control- modified as needed  5. Discharge Planning- pending clearance from Physical Therapy/Social work services. Plan for discharge to home with home health, likely today.    JADEN Thakkar  Nurse Practitioner for Dr. Rishabh Arita  P: [phone]  C: [phone]  F: [phone]

## 2024-06-19 NOTE — PHYSICAL THERAPY NOTE
PHYSICAL THERAPY KNEE TREATMENT NOTE - INPATIENT     Room Number: Room 5/Room 5-A             Presenting Problem: s/p L TKA on        Problem List  Principal Problem:    Arthritis of left knee  Active Problems:    Asthma (HCC)    Essential hypertension    Hyperlipidemia    Generalized anaphylaxis, sequela    Prediabetes    Status post total left knee replacement      PHYSICAL THERAPY ASSESSMENT   Patient demonstrates good  progress this session, goals  remain in progress.    Patient continues to function below baseline with bed mobility, transfers, and gait.  Contributing factors to remaining limitations include decreased functional strength, decreased endurance/aerobic capacity, and pain.  Next session anticipate patient to progress bed mobility, transfers, and gait.  Physical Therapy will continue to follow patient for duration of hospitalization.    Patient continues to benefit from continued skilled PT services: at discharge to promote functional independence in home.  Anticipate patient will return home with home health PT.    PLAN  PT Treatment Plan: Bed mobility;Body mechanics;Endurance  Frequency (Obs): Daily    SUBJECTIVE  Pt reports being ready for PT RX    OBJECTIVE  Precautions:  (Provena pressure dressing)    WEIGHT BEARING STATUS           L Lower Extremity: Weight Bearing as Tolerated    PAIN ASSESSMENT   Ratin  Location: left knee/ankle  Management Techniques: Activity promotion;Body mechanics;Repositioning    BALANCE  Static Sitting: Good  Dynamic Sitting: Fair +  Static Standing: Fair  Dynamic Standing: Fair -    ACTIVITY TOLERANCE                         O2 WALK       AM-PAC '6-Clicks' INPATIENT SHORT FORM - BASIC MOBILITY  How much difficulty does the patient currently have...  Patient Difficulty: Turning over in bed (including adjusting bedclothes, sheets and blankets)?: A Little   Patient Difficulty: Sitting down on and standing up from a chair with arms (e.g., wheelchair, bedside  commode, etc.): A Little   Patient Difficulty: Moving from lying on back to sitting on the side of the bed?: A Little   How much help from another person does the patient currently need...   Help from Another: Moving to and from a bed to a chair (including a wheelchair)?: A Little   Help from Another: Need to walk in hospital room?: A Little   Help from Another: Climbing 3-5 steps with a railing?: A Little     AM-PAC Score:  Raw Score: 18   Approx Degree of Impairment: 46.58%   Standardized Score (AM-PAC Scale): 43.63   CMS Modifier (G-Code): CK    FUNCTIONAL ABILITY STATUS  Functional Mobility/Gait Assessment  Gait Assistance: Contact guard assist  Distance (ft): 2 x 100  Assistive Device: Rolling walker  Pattern: L Decreased stance time  Stairs: Stairs  How Many Stairs: 12  Device: 2 Rails  Assist: Contact guard assist  Pattern: Ascend and Descend  Rolling: minimal assist  Supine to Sit: minimal assist  Sit to Supine: minimal assist  Sit to Stand: minimal assist    Additional Information: Pm session.Min a for bed mobility and transfer.EOB sitting balance activity with emphasis on core stabilization.Pt amb 2 x 100 ft with RW and CGA.Navigated 12 stairs with CGA.There ex.    The patient's Approx Degree of Impairment: 46.58% has been calculated based on documentation in the [facility] '6 clicks' Inpatient Basic Mobility Short Form.  Research supports that patients with this level of impairment may benefit from Pt reports being ready for PT RX.  Final disposition will be made by interdisciplinary medical team.    Exercises AM Session PM Session   Ankle Pumps 20 reps 20 reps   Quad Sets 20 reps  20 reps   Glut Sets 20 reps 20  reps                                               Comments: Pt participated in group session, tolerance was .    Knee ROM                 Patient End of Session: Up in chair;Call light within reach;Bracing education provided per handout;All patient questions and concerns addressed    CURRENT  GOALS    Patient Goal Patient's self-stated goal is: go home   Goal #1 Patient is able to demonstrate supine - sit EOB @ level: modified independent     Goal #1   Current Status Min a   Goal #2 Patient is able to demonstrate transfers Sit to/from Stand at assistance level: supervision     Goal #2  Current Status Min a   Goal #3 Patient is able to ambulate 150 feet with assistive device at assistance level: supervision   Goal #3   Current Status Pt amb 2 x 100 ft with RW and CGa   Goal #4 Patient will negotiate 4 stairs/one curb w/ assistive device and supervision   Goal #4   Current Status Navigated 12 stairs with CGA   Goal #5  AROM 0 degrees extension to 95 degrees flexion     Goal #5   Current Status In progress   Goal #6 Patient independently performs home exercise program for ROM/strengthening per the instructions provided in preparation for discharge.   Goal #6  Current Status In progress     Gait Training: 15 minutes  Therapeutic Activity: 15 minutes  Neuromuscular Re-education:  minutes  Therapeutic Exercise: 15 minutes  Canalith Repositioning:  minutes  Manual Therapy:  minutes  Can add/delete any of these

## 2024-06-19 NOTE — CM/SW NOTE
06/19/24 0900   Discharge disposition   Expected discharge disposition Home-Health   Post Acute Care Provider [facility]   Discharge transportation Private car       [facility] notified of dc today.   HH orders and F2F attached to referral.    [facility] [place]  [address]  [place] [address]  Phone: [phone]  Fax: [phone]    Priscila Witt RN, BSN  Nurse   [phone]

## 2024-06-19 NOTE — PROGRESS NOTES
[facility]  part of [facility]    Progress Note    Grace Silva Patient Status:  Outpatient in a Bed    1955 MRN [id number]   Location [facility] Attending Rishabh Arita MD   Hosp Day # 1 PCP Andrew Chacon MD       Subjective:   Grace Silva is a(n) 68 year old female POD# 1 Feels she is doing well. No chest pain, dyspnea.    Objective:   Vital Signs:  Blood pressure 134/68, pulse 70, temperature 98.2 °F (36.8 °C), temperature source Oral, resp. rate 14, height 5' 3\" (1.6 m), weight 169 lb (76.7 kg), SpO2 95%.     General: No acute distress. Alert and oriented x 3.  HEENT: Moist mucous membranes. EOM-I. PERRL  Respiratory: Clear to auscultation bilaterally.  No wheezes. No rhonchi.  Cardiovascular: S1, S2.  Regular rate and rhythm.  No murmurs.  Abdomen: Soft, nontender, nondistended.   Neurologic: No focal neurological deficits.  Musculoskeletal: No calf tenderness.   Integument: No lesions. No erythema.  Psychiatric: Appropriate mood and affect.      Results:    Lab Results   Component Value Date    WBC 7.6 2024    HGB 11.3 (L) 2024    HCT 33.8 (L) 2024    .0 2024    CREATSERUM 0.67 2024    BUN 14 2024     2024    K 3.8 2024     2024    CO2 29.0 2024     (H) 2024    CA 9.1 2024    ALB 5.1 (H) 2024    ALKPHO 59 2024    BILT 0.8 2024    TP 7.5 2024    AST 23 2024    ALT 14 2024         XR KNEE (1 OR 2 VIEWS), LEFT (CPT=73560)    Result Date: 2024  CONCLUSION:  1. Total left knee arthroplasty.    Dictated by (CST): Jose Decker MD on 2024 at 12:51 PM     Finalized by (CST): Jose Decker MD on 2024 at 12:52 PM               Assessment and Plan:       Primary osteoarthritis of one knee, left        Arthritis of left knee        Asthma (HCC)        Essential hypertension        Hyperlipidemia        Generalized anaphylaxis,  sequela        Leg cramps        Prediabetes                Postoperative Recommendations:  Anticoagulation / DVT prophylaxis: SCDs, early ambulation. Xarelto 10 mg daily with food for four weeks due to aspirin allergy    Pain Control: per ortho  GI protection: Protonix  Incentive Spirometry   Telemetry as needed  CPAP/O2 as needed       Pain management and Physical therapy as per Orthopedic service.   Home Health as needed          Crispin Cabral MD  6/19/2024

## (undated) DEVICE — HANDPIECE SET WITH HIGH FLOW TIP AND SUCTION TUBE: Brand: INTERPULSE

## (undated) DEVICE — GAMMEX® PI HYBRID SIZE 7.5, STERILE POWDER-FREE SURGICAL GLOVE, POLYISOPRENE AND NEOPRENE BLEND: Brand: GAMMEX

## (undated) DEVICE — 35 ML SYRINGE LUER-LOCK TIP: Brand: MONOJECT

## (undated) DEVICE — NEEDLE HYPO 18GA L1.5IN PNK SS PVT SHLD BVL

## (undated) DEVICE — WRAP COOLING KNEE W/ICE PILLOW

## (undated) DEVICE — DISPOSABLE TOURNIQUET CUFF SINGLE BLADDER, DUAL PORT AND QUICK CONNECT CONNECTOR: Brand: COLOR CUFF

## (undated) DEVICE — COTTON UNDERCAST PADDING,REGULAR FINISH: Brand: WEBRIL

## (undated) DEVICE — APPLICATOR PREP 26ML CHG 2% ISO ALC 70%

## (undated) DEVICE — SCREW FIX 3.5X48MM HEX HD

## (undated) DEVICE — ISLAND DRESSING 4IN X 10IN: Brand: SILVERLON ANTIMICROBIAL WOUND DRESSING

## (undated) DEVICE — PIN DRL 75MM HDLSS TRCR NXGN

## (undated) DEVICE — 60 ML SYRINGE LUER-LOCK TIP: Brand: MONOJECT

## (undated) DEVICE — TOWEL,OR,DSP,ST,BLUE,DLX,2/PK,40PK/CS: Brand: MEDLINE

## (undated) DEVICE — HOOD: Brand: FLYTE

## (undated) DEVICE — 2T11 #2 PDO 36 X 36: Brand: 2T11 #2 PDO 36 X 36

## (undated) DEVICE — PENCIL SMK EVAC L10FT MPLR BLDE JAW OPN

## (undated) DEVICE — SUT VCRL 1-0 36IN CTX ABSRB UD L48MM 1/2 CIR

## (undated) DEVICE — SUT VCRL 2-0 36IN CT-1 ABSRB UD L36MM 1/2 CIR

## (undated) DEVICE — SLIM BODY SKIN STAPLER: Brand: APPOSE ULC

## (undated) DEVICE — CEMENT MIXING SYSTEM WITH FEMORAL BREAKWAY NOZZLE: Brand: REVOLUTION

## (undated) DEVICE — SOLUTION IV 1000ML 0.9% NACL PRESERVATIVE

## (undated) DEVICE — SOLUTION IRRIG 3000ML 0.9% NACL FLX CONT

## (undated) DEVICE — BIPOLAR SEALER 23-112-1 AQM 6.0: Brand: AQUAMANTYS™

## (undated) DEVICE — Device: Brand: STABLECUT®

## (undated) DEVICE — KIT NEG PRSS PREVENA DRAIN 20CM INCIS MGMT PEEL PALACE

## (undated) DEVICE — SPONGE GZ 4X4IN COT 12 PLY TYP VII WVN C

## (undated) DEVICE — Device

## (undated) DEVICE — SHEET,DRAPE,53X77,STERILE: Brand: MEDLINE

## (undated) DEVICE — SOLUTION IRRIG 1000ML 0.9% NACL USP BTL

## (undated) DEVICE — PACK CDS TOTAL KNEE

## (undated) DEVICE — GAMMEX® PI HYBRID SIZE 6.5, STERILE POWDER-FREE SURGICAL GLOVE, POLYISOPRENE AND NEOPRENE BLEND: Brand: GAMMEX

## (undated) DEVICE — GAMMEX® PI HYBRID SIZE 8, STERILE POWDER-FREE SURGICAL GLOVE, POLYISOPRENE AND NEOPRENE BLEND: Brand: GAMMEX

## (undated) DEVICE — 3M™ COBAN™ NL STERILE NON-LATEX SELF-ADHERENT WRAP, 2084S, 4 IN X 5 YD (10 CM X 4,5 M), 18 ROLLS/CASE: Brand: 3M™ COBAN™

## (undated) DEVICE — SUT COAT VCRL 0 27IN CP-1 ABSRB UD 36MM 1/2

## (undated) DEVICE — SCREW ORTH 2.5X25MM FEM HEX PERSONA